# Patient Record
Sex: FEMALE | Race: WHITE | Employment: PART TIME | URBAN - NONMETROPOLITAN AREA
[De-identification: names, ages, dates, MRNs, and addresses within clinical notes are randomized per-mention and may not be internally consistent; named-entity substitution may affect disease eponyms.]

---

## 2017-06-14 ENCOUNTER — TRANSFERRED RECORDS (OUTPATIENT)
Dept: HEALTH INFORMATION MANAGEMENT | Facility: HOSPITAL | Age: 37
End: 2017-06-14

## 2017-06-15 ENCOUNTER — TRANSFERRED RECORDS (OUTPATIENT)
Dept: HEALTH INFORMATION MANAGEMENT | Facility: HOSPITAL | Age: 37
End: 2017-06-15

## 2017-06-21 RX ORDER — NORGESTIMATE AND ETHINYL ESTRADIOL 7DAYSX3 28
1 KIT ORAL DAILY
Status: ON HOLD | COMMUNITY
End: 2017-07-28

## 2017-06-27 ENCOUNTER — OFFICE VISIT (OUTPATIENT)
Dept: OBGYN | Facility: OTHER | Age: 37
End: 2017-06-27
Attending: OBSTETRICS & GYNECOLOGY
Payer: MEDICAID

## 2017-06-27 VITALS
HEART RATE: 80 BPM | SYSTOLIC BLOOD PRESSURE: 112 MMHG | HEIGHT: 65 IN | BODY MASS INDEX: 38.99 KG/M2 | WEIGHT: 234 LBS | DIASTOLIC BLOOD PRESSURE: 62 MMHG | TEMPERATURE: 98.3 F | RESPIRATION RATE: 16 BRPM

## 2017-06-27 DIAGNOSIS — Z30.2 ENCOUNTER FOR STERILIZATION: Primary | ICD-10-CM

## 2017-06-27 DIAGNOSIS — Z71.6 TOBACCO ABUSE COUNSELING: ICD-10-CM

## 2017-06-27 PROCEDURE — 99212 OFFICE O/P EST SF 10 MIN: CPT

## 2017-06-27 PROCEDURE — 99203 OFFICE O/P NEW LOW 30 MIN: CPT | Mod: 57 | Performed by: OBSTETRICS & GYNECOLOGY

## 2017-06-27 ASSESSMENT — PAIN SCALES - GENERAL: PAINLEVEL: NO PAIN (0)

## 2017-06-27 NOTE — NURSING NOTE
"Chief Complaint   Patient presents with     Consult     Consult for a tubal ligation per Dr Jones.       Initial /62  Pulse 80  Temp 98.3  F (36.8  C) (Tympanic)  Resp 16  Ht 5' 4.75\" (1.645 m)  Wt 234 lb (106.1 kg)  BMI 39.24 kg/m2 Estimated body mass index is 39.24 kg/(m^2) as calculated from the following:    Height as of this encounter: 5' 4.75\" (1.645 m).    Weight as of this encounter: 234 lb (106.1 kg).  Medication Reconciliation: complete   Meagan Thomas      "

## 2017-06-27 NOTE — MR AVS SNAPSHOT
"              After Visit Summary   6/27/2017    Devorah Parker    MRN: [id number]           Patient Information     Date Of Birth          1980        Visit Information        Provider Department      6/27/2017 2:00 PM Perez Valdivia MD [facility] [place]        Care Instructions    Nothing to eat or drink after midnight prior to procedure.            Follow-ups after your visit        Your next 10 appointments already scheduled     Jul 21, 2017  1:30 PM CDT   (Arrive by 1:15 PM)   Pre-Op physical with Ely Potter NP   [facility] [place] ([facility] [place] )    [address]  [place] [address]   [phone]              Who to contact     If you have questions or need follow up information about today's clinic visit or your schedule please contact [facility] directly at [phone].  Normal or non-critical lab and imaging results will be communicated to you by MyChart, letter or phone within 4 business days after the clinic has received the results. If you do not hear from us within 7 days, please contact the clinic through MyChart or phone. If you have a critical or abnormal lab result, we will notify you by phone as soon as possible.  Submit refill requests through [employer] or call your pharmacy and they will forward the refill request to us. Please allow 3 business days for your refill to be completed.          Additional Information About Your Visit        MyChart Information     [employer] lets you send messages to your doctor, view your test results, renew your prescriptions, schedule appointments and more. To sign up, go to [email or web address] . Click on \"Log in\" on the left side of the screen, which will take you to the Welcome page. Then click on \"Sign up Now\" on the right side of the page.     You will be asked to enter the access code listed below, as well as some personal information. Please follow the directions to create your username and password.   " "  Your access code is: [email or web address]  Expires: 2017  6:34 PM     Your access code will  in 90 days. If you need help or a new code, please call your [facility] clinic or [phone].        Care EveryWhere ID     This is your Care EveryWhere ID. This could be used by other organizations to access your [facility] medical records  [id number]        Your Vitals Were     Pulse Temperature Respirations Height BMI (Body Mass Index)       80 98.3  F (36.8  C) (Tympanic) 16 5' 4.75\" (1.645 m) 39.24 kg/m2        Blood Pressure from Last 3 Encounters:   17 112/62   16 109/64   01/01/15 113/71    Weight from Last 3 Encounters:   17 234 lb (106.1 kg)   16 260 lb (117.9 kg)   14 260 lb (117.9 kg)              Today, you had the following     No orders found for display         Today's Medication Changes          These changes are accurate as of: 17 11:59 PM.  If you have any questions, ask your nurse or doctor.               Stop taking these medicines if you haven't already. Please contact your care team if you have questions.     prenatal multivitamin  plus iron 27-0.8 MG Tabs per tablet   Stopped by:  Perez Valdivia MD                    Primary Care Provider Office Phone # Fax #    Meagan Keane [phone]       [address] [address]        Equal Access to Services     [facility] AH: Hadii cody ku hadasho Soomaali, waaxda luqadaha, qaybta kaalmada adeegyada, kb crawford . So [facility] [phone].    ATENCIÓN: Si habla español, tiene a mills disposición servicios gratuitos de asistencia lingüística. Llame al [phone].    We comply with applicable federal civil rights laws and [place] laws. We do not discriminate on the basis of race, color, national origin, age, disability sex, sexual orientation or gender identity.            Thank you!     Thank you for choosing [facility]  for your " care. Our goal is always to provide you with excellent care. Hearing back from our patients is one way we can continue to improve our services. Please take a few minutes to complete the written survey that you may receive in the mail after your visit with us. Thank you!             Your Updated Medication List - Protect others around you: Learn how to safely use, store and throw away your medicines at www.disposemymeds.org.          This list is accurate as of: 6/27/17 11:59 PM.  Always use your most recent med list.                   Brand Name Dispense Instructions for use Diagnosis    NICORETTE MT      Take 2 mg by mouth every hour as needed for smoking cessation        norgestim-eth estrad triphasic 0.18/0.215/0.25 MG-35 MCG per tablet    ORTHO TRI-CYCLEN, TRI-SPRINTEC     Take 1 tablet by mouth daily

## 2017-06-28 NOTE — PROGRESS NOTES
"CC:  Consult from dr. Keane for Sterilization  HPI:  Devorah Parker is a 36 year old female is a   P4 (vag).  No LMP recorded.  Menses are regular q 28-30 days, lasting 5 days. Currently on OCP's and a smoker.  Pt desiring permanents sterilization.  100% sure.      Outside records reviewed.      Patients records are available and reviewed at today's visit.    Past GYN history:  No STD history.  CIS (LEEP )       Last PAP smear:  Normal/Pending      Past Medical History:   Diagnosis Date     ADD (attention deficit disorder)      Anxiety      Cervical dysplasia      Depression      GERD (gastroesophageal reflux disease)      Obesity        Past Surgical History:   Procedure Laterality Date     ARTHROSCOPY KNEE Left     ACL reconstruction     LEEP TX, CERVICAL       TONSILLECTOMY      at 4 yrs       Family History   Problem Relation Age of Onset     KIDNEY DISEASE Mother      stones     Breast Cancer Mother      Myocardial Infarction Maternal Grandmother       at age 50s     Lung Cancer Paternal Grandfather        Allergies: Review of patient's allergies indicates no known allergies.    Current Outpatient Prescriptions   Medication Sig Dispense Refill     Nicotine Polacrilex (NICORETTE MT) Take 2 mg by mouth every hour as needed for smoking cessation       norgestim-eth estrad triphasic (ORTHO TRI-CYCLEN, TRI-SPRINTEC) 0.18/0.215/0.25 MG-35 MCG per tablet Take 1 tablet by mouth daily         ROS:  C: NEGATIVE for fever, chills, change in weight  GI: NEGATIVE for nausea, abdominal pain, heartburn, or change in bowel habits  : NEGATIVE for frequency, dysuria, hematuria, vaginal discharge      EXAM:  Blood pressure 112/62, pulse 80, temperature 98.3  F (36.8  C), temperature source Tympanic, resp. rate 16, height 5' 4.75\" (1.645 m), weight 234 lb (106.1 kg), unknown if currently breastfeeding.   BMI= Body mass index is 39.24 kg/(m^2).  General - pleasant female in no acute distress.  Abdomen - soft, " nontender, nondistended, no hepatosplenomegaly.  Musculoskeletal - no gross deformities.  Neurological - normal mental status.  Extremities:  No edema      ASSESSMENT/PLAN:  Desires Sterilization  Contraceptive alternatives discussed.  Reviewed goals, risks, alternatives for tubal occlusion procedure including risk of anesthesia, bleeding, infection, damage to nerves, blood vessels, bowel and bladder. Discussed irreversibility of procedure, 1% failure rate, tubal pregnancy, menstrual changes and regret.  Discussed recovery period and expected discomfort.. All questions were answered. Preoperative instructions discussed.  NPO after midnight.  Federal sterilization request reviewed and signed.  Counseled on and encouraged smoking cessation.  30  minutes were spent with the patient with greater than 50% of the visit spent in face-to-face counseling and coordination of care and review of outside records.  Handouts on procedure reviewed with pt.  .          Perez Valdivia MD

## 2017-06-30 DIAGNOSIS — Z30.2 ENCOUNTER FOR STERILIZATION: Primary | ICD-10-CM

## 2017-07-21 ENCOUNTER — OFFICE VISIT (OUTPATIENT)
Dept: OBGYN | Facility: OTHER | Age: 37
End: 2017-07-21
Attending: NURSE PRACTITIONER
Payer: COMMERCIAL

## 2017-07-21 VITALS
BODY MASS INDEX: 37.99 KG/M2 | HEART RATE: 72 BPM | DIASTOLIC BLOOD PRESSURE: 64 MMHG | WEIGHT: 228 LBS | TEMPERATURE: 97.8 F | HEIGHT: 65 IN | SYSTOLIC BLOOD PRESSURE: 110 MMHG

## 2017-07-21 DIAGNOSIS — Z01.818 PREOP GENERAL PHYSICAL EXAM: Primary | ICD-10-CM

## 2017-07-21 DIAGNOSIS — Z72.0 TOBACCO ABUSE: ICD-10-CM

## 2017-07-21 LAB
ERYTHROCYTE [DISTWIDTH] IN BLOOD BY AUTOMATED COUNT: 13.3 % (ref 10–15)
HCT VFR BLD AUTO: 37.5 % (ref 35–47)
HGB BLD-MCNC: 12.7 G/DL (ref 11.7–15.7)
MCH RBC QN AUTO: 28 PG (ref 26.5–33)
MCHC RBC AUTO-ENTMCNC: 33.9 G/DL (ref 31.5–36.5)
MCV RBC AUTO: 83 FL (ref 78–100)
PLATELET # BLD AUTO: 188 10E9/L (ref 150–450)
RBC # BLD AUTO: 4.54 10E12/L (ref 3.8–5.2)
WBC # BLD AUTO: 4.5 10E9/L (ref 4–11)

## 2017-07-21 PROCEDURE — 99212 OFFICE O/P EST SF 10 MIN: CPT

## 2017-07-21 PROCEDURE — 99214 OFFICE O/P EST MOD 30 MIN: CPT | Performed by: NURSE PRACTITIONER

## 2017-07-21 PROCEDURE — 85027 COMPLETE CBC AUTOMATED: CPT | Mod: ZL | Performed by: NURSE PRACTITIONER

## 2017-07-21 PROCEDURE — 36415 COLL VENOUS BLD VENIPUNCTURE: CPT | Mod: ZL | Performed by: NURSE PRACTITIONER

## 2017-07-21 NOTE — PROGRESS NOTES
[facility] [place]  [address]  [place] [address]  [phone]  Dept: [phone]    PRE-OP EVALUATION:  Today's date: 2017    Devorah Parker (: 1980) presents for pre-operative evaluation assessment as requested by Dr. Valdivia.  She requires evaluation and anesthesia risk assessment prior to undergoing surgery/procedure for sterilization .  Proposed procedure: Bilateral Tubal Occlusion    Date of Surgery/ Procedure: 17  Time of Surgery/ Procedure: To be determined  Hospital/Surgical Facility: [facility]    Primary Physician: Meagan Keane  Type of Anesthesia Anticipated: to be determined    Patient has a Health Care Directive or Living Will:  NO    1. NO - Do you have a history of heart attack, stroke, stent, bypass or surgery on an artery in the head, neck, heart or legs?  2. NO - Do you ever have any pain or discomfort in your chest?  3. NO - Do you have a history of  Heart Failure?  4. NO - Are you troubled by shortness of breath when: walking on the level, up a slight hill or at night?  5. NO - Do you currently have a cold, bronchitis or other respiratory infection?  6. NO - Do you have a cough, shortness of breath or wheezing?  7. NO - Do you sometimes get pains in the calves of your legs when you walk?  8. NO - Do you or anyone in your family have previous history of blood clots?  9. NO - Do you or does anyone in your family have a serious bleeding problem such as prolonged bleeding following surgeries or cuts?  10. NO - Have you ever had problems with anemia or been told to take iron pills?  11. NO - Have you had any abnormal blood loss such as black, tarry or bloody stools, or abnormal vaginal bleeding?  12. NO - Have you ever had a blood transfusion?  13. NO - Have you or any of your relatives ever had problems with anesthesia?  14. NO - Do you have sleep apnea, excessive snoring or daytime drowsiness?  15. NO - Do you have any prosthetic heart  valves?  16. NO - Do you have prosthetic joints?  17. NO - Is there any chance that you may be pregnant?        HPI:                                                      Brief HPI related to upcoming procedure: desires sterilization as she has completed child bearing.       See problem list for active medical problems.  Problems all longstanding and stable, except as noted/documented.  See ROS for pertinent symptoms related to these conditions.                                                                                                  .    MEDICAL HISTORY:                                                    Patient Active Problem List    Diagnosis Date Noted     Normal labor and delivery 03/18/2016     Priority: Medium     Normal pregnancy 03/17/2016     Priority: Medium     Active labor 12/31/2014     Priority: Medium     Vaginal delivery 12/31/2014     Priority: Medium     Encounter for triage in pregnant patient 12/30/2014     Priority: Medium     Attention deficit disorder 09/13/2012     Priority: Medium     Depression 06/15/2011     Priority: Medium     Adiposity 05/16/2008     Priority: Medium     History of cervical dysplasia 05/16/2008     Priority: Medium     Overview:   IMO Update 10/11       Cervical intraepithelial neoplasia 11/26/2007     Priority: Medium     Overview:   IMO Update 10/11       Positive test for human papillomavirus (HPV) 11/06/2007     Priority: Medium     Nonspecific abnormal finding 02/05/2007     Priority: Medium     Overview:   HPV +, colp low grade, path chronic cervicitis        Past Medical History:   Diagnosis Date     ADD (attention deficit disorder)      Anxiety      Cervical dysplasia      Depression      GERD (gastroesophageal reflux disease)      Obesity      Past Surgical History:   Procedure Laterality Date     ARTHROSCOPY KNEE Left     ACL reconstruction     LEEP TX, CERVICAL       TONSILLECTOMY      at 4 yrs     Current Outpatient Prescriptions   Medication Sig  "Dispense Refill     Nicotine Polacrilex (NICORETTE MT) Take 2 mg by mouth every hour as needed for smoking cessation       norgestim-eth estrad triphasic (ORTHO TRI-CYCLEN, TRI-SPRINTEC) 0.18/0.215/0.25 MG-35 MCG per tablet Take 1 tablet by mouth daily       OTC products: None, except as noted above    No Known Allergies   Latex Allergy: NO    Social History   Substance Use Topics     Smoking status: Current Every Day Smoker     Packs/day: 0.25     Types: Cigarettes     Smokeless tobacco: Never Used      Comment: using gum     Alcohol use No     History   Drug Use No       REVIEW OF SYSTEMS:                                                    Constitutional, HEENT, cardiovascular, pulmonary, gi and gu systems are negative, except as otherwise noted.      EXAM:                                                    /64 (BP Location: Right arm, Patient Position: Sitting, Cuff Size: Adult Large)  Pulse 72  Temp 97.8  F (36.6  C) (Tympanic)  Ht 5' 4.75\" (1.645 m)  Wt 228 lb (103.4 kg)  Breastfeeding? No  BMI 38.23 kg/m2    GENERAL APPEARANCE: healthy, alert and no distress     EYES: EOMI, PERRL     HENT: ear canals and TM's normal and nose and mouth without ulcers or lesions     NECK: no adenopathy, no asymmetry, masses, or scars and thyroid normal to palpation     RESP: lungs clear to auscultation - no rales, rhonchi or wheezes     CV: regular rates and rhythm, normal S1 S2, no S3 or S4 and no murmur, click or rub     ABDOMEN:  soft, nontender, no HSM or masses and bowel sounds normal     MS: extremities normal- no gross deformities noted, no evidence of inflammation in joints, FROM in all extremities.     SKIN: no suspicious lesions or rashes     NEURO: Normal strength and tone, sensory exam grossly normal, mentation intact and speech normal     PSYCH: mentation appears normal. and affect normal/bright     LYMPHATICS: No axillary, cervical, or supraclavicular nodes    DIAGNOSTICS:                                "                       Labs Resulted Today:   Results for orders placed or performed in visit on 07/21/17   CBC with platelets   Result Value Ref Range    WBC 4.5 4.0 - 11.0 10e9/L    RBC Count 4.54 3.8 - 5.2 10e12/L    Hemoglobin 12.7 11.7 - 15.7 g/dL    Hematocrit 37.5 35.0 - 47.0 %    MCV 83 78 - 100 fl    MCH 28.0 26.5 - 33.0 pg    MCHC 33.9 31.5 - 36.5 g/dL    RDW 13.3 10.0 - 15.0 %    Platelet Count 188 150 - 450 10e9/L       Recent Labs   Lab Test  03/19/16   0920  03/18/16   0826   12/31/14   0050   HGB  10.7*  12.5   < >  12.2   PLT   --   191   --   191    < > = values in this interval not displayed.        IMPRESSION:                                                    Reason for surgery/procedure: female sterilization  Diagnosis/reason for consult: pre op clearance    The proposed surgical procedure is considered LOW risk.    REVISED CARDIAC RISK INDEX  The patient has the following serious cardiovascular risks for perioperative complications such as (MI, PE, VFib and 3  AV Block):  No serious cardiac risks  INTERPRETATION: 1 risks: Class II (low risk - 0.9% complication rate)    No diagnosis found.    RECOMMENDATIONS:                                                        Pulmonary Risk  Advised smoking cessation.         --Patient is to take all scheduled medications on the day of surgery EXCEPT for modifications listed below.    APPROVAL GIVEN to proceed with proposed procedure, without further diagnostic evaluation       Signed Electronically by: Ely Potter NP    Copy of this evaluation report is provided to requesting physician.    [facility] Preop Guidelines

## 2017-07-21 NOTE — NURSING NOTE
"Chief Complaint   Patient presents with     Pre-Op Exam     Patient is scheduled on 7/28/17 with Dr. Valdivia for a bilateral tubal occlusion at [facility].       Initial /64 (BP Location: Right arm, Patient Position: Sitting, Cuff Size: Adult Large)  Pulse 72  Temp 97.8  F (36.6  C) (Tympanic)  Ht 5' 4.75\" (1.645 m)  Wt 228 lb (103.4 kg)  Breastfeeding? No  BMI 38.23 kg/m2 Estimated body mass index is 38.23 kg/(m^2) as calculated from the following:    Height as of this encounter: 5' 4.75\" (1.645 m).    Weight as of this encounter: 228 lb (103.4 kg).  Medication Reconciliation: complete   Rosa Isela Perez      "

## 2017-07-21 NOTE — MR AVS SNAPSHOT
After Visit Summary   7/21/2017    Devorah Parker    MRN: [id number]           Patient Information     Date Of Birth          1980        Visit Information        Provider Department      7/21/2017 1:30 PM Ely Potter NP [facility] [place]        Today's Diagnoses     Preop general physical exam    -  1    Tobacco abuse          Care Instructions      Before Your Surgery      Call your surgeon if there is any change in your health. This includes signs of a cold or flu (such as a sore throat, runny nose, cough, rash or fever).    Do not smoke, drink alcohol or take over the counter medicine (unless your surgeon or primary care doctor tells you to) for the 24 hours before and after surgery.    If you take prescribed drugs: Follow your doctor s orders about which medicines to take and which to stop until after surgery.    Eating and drinking prior to surgery: follow the instructions from your surgeon    Take a shower or bath the night before surgery. Use the soap your surgeon gave you to gently clean your skin. If you do not have soap from your surgeon, use your regular soap. Do not shave or scrub the surgery site.  Wear clean pajamas and have clean sheets on your bed.   HOW TO QUIT SMOKING  Smoking is one of the hardest habits to break. About half of all those who have ever smoked have been able to quit, and most of those (about 70%) who still smoke want to quit. Here are some of the best ways to stop smoking.     KEEP TRYING:  It takes most smokers about 8 tries before they are finally able to fully quit. So, the more often you try and fail, the better your chance of quitting the next time! So, don't give up!    GO COLD TURKEY:  Most ex-smokers quit cold turkey. Trying to cut back gradually doesn't seem to work as well, perhaps because it continues the smoking habit. Also, it is possible to fool yourself by inhaling more while smoking fewer cigarettes. This results in the same amount of  nicotine in your body!    GET SUPPORT:  Support programs can make an important difference, especially for the heavy smoker. These groups offer lectures, methods to change your behavior and peer support. Call the free national Quitline for more information. 800-QUIT-NOW (603-753-5257). Low-cost or free programs are offered by many hospitals, local chapters of the American Lung Association (833-710-7474) and the American Cancer Society (395-614-6994). Support at home is important too. Non-smokers can help by offering praise and encouragement. If the smoker fails to quit, encourage them to try again!    OVER-THE-COUNTER MEDICINES:  For those who can't quit on their own, Nicotine Replacement Therapy (NRT) may make quitting much easier. Certain aids such as the nicotine patch, gum and lozenge are available without a prescription. However, it is best to use these under the guidance of your doctor. The skin patch provides a steady supply of nicotine to the body. Nicotine gum and lozenge gives temporary bursts of low levels of nicotine. Both methods take the edge off the craving for cigarettes. WARNING: If you feel symptoms of nicotine overdose, such as nausea, vomiting, dizziness, weakness, or fast heartbeat, stop using these and see your doctor.    PRESCRIPTION MEDICINES:  After evaluating your smoking patterns and prior attempts at quitting, your doctor may offer a prescription medicine such as bupropion (Zyban, Wellbutrin), varenicline (Chantix, Champix), a niocotine inhaler or nasal spray. Each has its unique advantage and side effects which your doctor can review with you.    HEALTH BENEFITS OF QUITTING:  The benefits of quitting start right away and keep improving the longer you go without smokin minutes: blood pressure and pulse return to normal  8 hours: oxygen levels return to normal  2 days: ability to smell and taste begins to improve as damaged nerves start to regrow  2-3 weeks: circulation and lung  function improves  1-9 months: decreased cough, congestion and shortness of breath; less tired  1 year: risk of heart attack decreases by half  5 years: risk of lung cancer decreases by half; risk of stroke becomes the same as a non-smoker  For information about how to quit smoking, visit the following links:  National Cancer [place] ,   Clearing the Air, Quit Smoking Today   - an online booklet. http://www.smokefree.gov/pubs/clearing_the_air.pdf  Smokefree.gov http://smokefree.gov/  QuitNet http://www.quitnet.com/    9785-7887 [employer], [address] [place] [address], [place] [address]. All rights reserved. This information is not intended as a substitute for professional medical care. Always follow your healthcare professional's instructions.      The Benefits of Living Smoke Free  What do you want to gain from quitting? Check off some reasons to quit.  Health Benefits  ___ Reduce my risk of lung cancer, heart disease, chronic lung disease  ___ Have fewer wrinkles and softer skin  ___ Improve my sense of taste and smell  ___ For pregnant women--reduce the risk of having a miscarriage, stillbirth, premature birth, or low-birth-weight baby  Personal Benefits  ___ Feel more in control of my life  ___ Have better-smelling hair, breath, clothes, home, and car  ___ Save time by not having to take smoke breaks, buy cigarettes, or hunt for a light  ___ Have whiter teeth  Family Benefits  ___ Reduce my children s respiratory tract infections  ___ Set a good example for my children  ___ Reduce my family s cancer risk  Financial Benefits  ___ Save hundreds of dollars each year that would be spent on cigarettes  ___ Save money on medical bills  ___ Save on life, health, and car insurance premiums    Those Dollars Add Up!  Cigarettes are expensive, and getting more expensive all the time. Do you realize how much money you are spending on cigarettes per year? What is the average amount you spend on a pack of cigarettes? What  is the average number of packs that you smoke per day? Using your answers to these questions, fill in this formula to help you find out:  ($ _____ per pack) ×  ( _____ number of packs per day) × (365 days) =  $ _____ yearly cost of smoking  Besides tobacco, there are other costs, including extra cleaning bills and replacement costs for clothing and furniture; medical expenses for smoking-related illnesses; and higher health, life, and car insurance premiums.    Cigars and Pipes Count Too!  Cigars and pipes are also dangerous. So are smokeless (chewing) tobacco and snuff. All of these products contain nicotine, a highly addictive substance that has harmful effects on your body. Quitting smoking means giving up all tobacco products.      2991-8406 [facility], [address] [place] [address]. All rights reserved. This information is not intended as a substitute for professional medical care. Always follow your healthcare professional's instructions.          Follow-ups after your visit        Your next 10 appointments already scheduled     Jul 28, 2017   Procedure with Perez Valdivia MD   [facility] ([address] [address] [phone]              Who to contact     If you have questions or need follow up information about today's clinic visit or your schedule please contact [facility] directly at [phone].  Normal or non-critical lab and imaging results will be communicated to you by MyChart, letter or phone within 4 business days after the clinic has received the results. If you do not hear from us within 7 days, please contact the clinic through MyChart or phone. If you have a critical or abnormal lab result, we will notify you by phone as soon as possible.  Submit refill requests through [employer] or call your pharmacy and they will forward the refill request to us. Please allow 3 business days for your refill to be completed.  "         Additional Information About Your Visit        MyChart Information     [employer] lets you send messages to your doctor, view your test results, renew your prescriptions, schedule appointments and more. To sign up, go to [email or web address] . Click on \"Log in\" on the left side of the screen, which will take you to the Welcome page. Then click on \"Sign up Now\" on the right side of the page.     You will be asked to enter the access code listed below, as well as some personal information. Please follow the directions to create your username and password.     Your access code is: [email or web address]  Expires: 2017  6:34 PM     Your access code will  in 90 days. If you need help or a new code, please call your [facility] clinic or [phone].        Care EveryWhere ID     This is your Care EveryWhere ID. This could be used by other organizations to access your [facility] medical records  [id number]        Your Vitals Were     Pulse Temperature Height Breastfeeding? BMI (Body Mass Index)       72 97.8  F (36.6  C) (Tympanic) 5' 4.75\" (1.645 m) No 38.23 kg/m2        Blood Pressure from Last 3 Encounters:   17 110/64   17 112/62   16 109/64    Weight from Last 3 Encounters:   17 228 lb (103.4 kg)   17 234 lb (106.1 kg)   16 260 lb (117.9 kg)              We Performed the Following     CBC with platelets     Tobacco Cessation - for Health Maintenance        Primary Care Provider Office Phone # Fax #    Meagan Keane [phone]       [address] [address]        Equal Access to Services     [facility] JEREL : Adonay López, heidi zee, kb [place]. [facility] [phone].    ATENCIÓN: Si habla español, tiene a mills disposición servicios gratuitos de asistencia lingüística. Llantoinette al [phone].    We comply with applicable federal civil rights laws and " [place] laws. We do not discriminate on the basis of race, color, national origin, age, disability sex, sexual orientation or gender identity.            Thank you!     Thank you for choosing [facility]  for your [facility]. Our goal is always to provide you with excellent care. Hearing back from our patients is one way we can continue to improve our services. Please take a few minutes to complete the written survey that you may receive in the mail after your visit with us. Thank you!             Your Updated Medication List - Protect others around you: Learn how to safely use, store and throw away your medicines at www.disposemymeds.org.          This list is accurate as of: 7/21/17  2:41 PM.  Always use your most recent med list.                   Brand Name Dispense Instructions for use Diagnosis    NICORETTE MT      Take 2 mg by mouth every hour as needed for smoking cessation        norgestim-eth estrad triphasic 0.18/0.215/0.25 MG-35 MCG per tablet    ORTHO TRI-CYCLEN, TRI-SPRINTEC     Take 1 tablet by mouth daily

## 2017-07-21 NOTE — PATIENT INSTRUCTIONS
Before Your Surgery      Call your surgeon if there is any change in your health. This includes signs of a cold or flu (such as a sore throat, runny nose, cough, rash or fever).    Do not smoke, drink alcohol or take over the counter medicine (unless your surgeon or primary care doctor tells you to) for the 24 hours before and after surgery.    If you take prescribed drugs: Follow your doctor s orders about which medicines to take and which to stop until after surgery.    Eating and drinking prior to surgery: follow the instructions from your surgeon    Take a shower or bath the night before surgery. Use the soap your surgeon gave you to gently clean your skin. If you do not have soap from your surgeon, use your regular soap. Do not shave or scrub the surgery site.  Wear clean pajamas and have clean sheets on your bed.   HOW TO QUIT SMOKING  Smoking is one of the hardest habits to break. About half of all those who have ever smoked have been able to quit, and most of those (about 70%) who still smoke want to quit. Here are some of the best ways to stop smoking.     KEEP TRYING:  It takes most smokers about 8 tries before they are finally able to fully quit. So, the more often you try and fail, the better your chance of quitting the next time! So, don't give up!    GO COLD TURKEY:  Most ex-smokers quit cold turkey. Trying to cut back gradually doesn't seem to work as well, perhaps because it continues the smoking habit. Also, it is possible to fool yourself by inhaling more while smoking fewer cigarettes. This results in the same amount of nicotine in your body!    GET SUPPORT:  Support programs can make an important difference, especially for the heavy smoker. These groups offer lectures, methods to change your behavior and peer support. Call the free national Quitline for more information. 800-QUIT-NOW (853-533-9522). Low-cost or free programs are offered by many hospitals, local chapters of the American Lung  Association (149-317-2635) and the American Cancer Society (888-076-4573). Support at home is important too. Non-smokers can help by offering praise and encouragement. If the smoker fails to quit, encourage them to try again!    OVER-THE-COUNTER MEDICINES:  For those who can't quit on their own, Nicotine Replacement Therapy (NRT) may make quitting much easier. Certain aids such as the nicotine patch, gum and lozenge are available without a prescription. However, it is best to use these under the guidance of your doctor. The skin patch provides a steady supply of nicotine to the body. Nicotine gum and lozenge gives temporary bursts of low levels of nicotine. Both methods take the edge off the craving for cigarettes. WARNING: If you feel symptoms of nicotine overdose, such as nausea, vomiting, dizziness, weakness, or fast heartbeat, stop using these and see your doctor.    PRESCRIPTION MEDICINES:  After evaluating your smoking patterns and prior attempts at quitting, your doctor may offer a prescription medicine such as bupropion (Zyban, Wellbutrin), varenicline (Chantix, Champix), a niocotine inhaler or nasal spray. Each has its unique advantage and side effects which your doctor can review with you.    HEALTH BENEFITS OF QUITTING:  The benefits of quitting start right away and keep improving the longer you go without smokin minutes: blood pressure and pulse return to normal  8 hours: oxygen levels return to normal  2 days: ability to smell and taste begins to improve as damaged nerves start to regrow  2-3 weeks: circulation and lung function improves  1-9 months: decreased cough, congestion and shortness of breath; less tired  1 year: risk of heart attack decreases by half  5 years: risk of lung cancer decreases by half; risk of stroke becomes the same as a non-smoker  For information about how to quit smoking, visit the following links:  National Cancer [place] ,   Clearing the Air, Quit Smoking Today   -  an online booklet. http://www.smokefree.gov/pubs/clearing_the_air.pdf  Smokefree.gov http://smokefree.gov/  QuitNet http://www.quitnet.com/    5456-2543 [employer], [address] [place] [address], [place] [address]. All rights reserved. This information is not intended as a substitute for professional medical care. Always follow your healthcare professional's instructions.      The Benefits of Living Smoke Free  What do you want to gain from quitting? Check off some reasons to quit.  Health Benefits  ___ Reduce my risk of lung cancer, heart disease, chronic lung disease  ___ Have fewer wrinkles and softer skin  ___ Improve my sense of taste and smell  ___ For pregnant women--reduce the risk of having a miscarriage, stillbirth, premature birth, or low-birth-weight baby  Personal Benefits  ___ Feel more in control of my life  ___ Have better-smelling hair, breath, clothes, home, and car  ___ Save time by not having to take smoke breaks, buy cigarettes, or hunt for a light  ___ Have whiter teeth  Family Benefits  ___ Reduce my children s respiratory tract infections  ___ Set a good example for my children  ___ Reduce my family s cancer risk  Financial Benefits  ___ Save hundreds of dollars each year that would be spent on cigarettes  ___ Save money on medical bills  ___ Save on life, health, and car insurance premiums    Those Dollars Add Up!  Cigarettes are expensive, and getting more expensive all the time. Do you realize how much money you are spending on cigarettes per year? What is the average amount you spend on a pack of cigarettes? What is the average number of packs that you smoke per day? Using your answers to these questions, fill in this formula to help you find out:  ($ _____ per pack) ×  ( _____ number of packs per day) × (365 days) =  $ _____ yearly cost of smoking  Besides tobacco, there are other costs, including extra cleaning bills and replacement costs for clothing and furniture; medical expenses for  smoking-related illnesses; and higher health, life, and car insurance premiums.    Cigars and Pipes Count Too!  Cigars and pipes are also dangerous. So are smokeless (chewing) tobacco and snuff. All of these products contain nicotine, a highly addictive substance that has harmful effects on your body. Quitting smoking means giving up all tobacco products.      7976-1658 [facility], [address] [place] [address], [place] [address]. All rights reserved. This information is not intended as a substitute for professional medical care. Always follow your healthcare professional's instructions.

## 2017-07-26 ENCOUNTER — ANESTHESIA EVENT (OUTPATIENT)
Dept: SURGERY | Facility: HOSPITAL | Age: 37
End: 2017-07-26
Payer: COMMERCIAL

## 2017-07-26 ASSESSMENT — LIFESTYLE VARIABLES: TOBACCO_USE: 1

## 2017-07-26 NOTE — H&P (VIEW-ONLY)
[facility] [place]  [address]  [place] [address]  [phone]  Dept: [phone]    PRE-OP EVALUATION:  Today's date: 2017    Devorah Parker (: 1980) presents for pre-operative evaluation assessment as requested by Dr. Valdivia.  She requires evaluation and anesthesia risk assessment prior to undergoing surgery/procedure for sterilization .  Proposed procedure: Bilateral Tubal Occlusion    Date of Surgery/ Procedure: 17  Time of Surgery/ Procedure: To be determined  Hospital/Surgical Facility: [facility]    Primary Physician: Meagan Keane  Type of Anesthesia Anticipated: to be determined    Patient has a Health Care Directive or Living Will:  NO    1. NO - Do you have a history of heart attack, stroke, stent, bypass or surgery on an artery in the head, neck, heart or legs?  2. NO - Do you ever have any pain or discomfort in your chest?  3. NO - Do you have a history of  Heart Failure?  4. NO - Are you troubled by shortness of breath when: walking on the level, up a slight hill or at night?  5. NO - Do you currently have a cold, bronchitis or other respiratory infection?  6. NO - Do you have a cough, shortness of breath or wheezing?  7. NO - Do you sometimes get pains in the calves of your legs when you walk?  8. NO - Do you or anyone in your family have previous history of blood clots?  9. NO - Do you or does anyone in your family have a serious bleeding problem such as prolonged bleeding following surgeries or cuts?  10. NO - Have you ever had problems with anemia or been told to take iron pills?  11. NO - Have you had any abnormal blood loss such as black, tarry or bloody stools, or abnormal vaginal bleeding?  12. NO - Have you ever had a blood transfusion?  13. NO - Have you or any of your relatives ever had problems with anesthesia?  14. NO - Do you have sleep apnea, excessive snoring or daytime drowsiness?  15. NO - Do you have any prosthetic heart  valves?  16. NO - Do you have prosthetic joints?  17. NO - Is there any chance that you may be pregnant?        HPI:                                                      Brief HPI related to upcoming procedure: desires sterilization as she has completed child bearing.       See problem list for active medical problems.  Problems all longstanding and stable, except as noted/documented.  See ROS for pertinent symptoms related to these conditions.                                                                                                  .    MEDICAL HISTORY:                                                    Patient Active Problem List    Diagnosis Date Noted     Normal labor and delivery 03/18/2016     Priority: Medium     Normal pregnancy 03/17/2016     Priority: Medium     Active labor 12/31/2014     Priority: Medium     Vaginal delivery 12/31/2014     Priority: Medium     Encounter for triage in pregnant patient 12/30/2014     Priority: Medium     Attention deficit disorder 09/13/2012     Priority: Medium     Depression 06/15/2011     Priority: Medium     Adiposity 05/16/2008     Priority: Medium     History of cervical dysplasia 05/16/2008     Priority: Medium     Overview:   IMO Update 10/11       Cervical intraepithelial neoplasia 11/26/2007     Priority: Medium     Overview:   IMO Update 10/11       Positive test for human papillomavirus (HPV) 11/06/2007     Priority: Medium     Nonspecific abnormal finding 02/05/2007     Priority: Medium     Overview:   HPV +, colp low grade, path chronic cervicitis        Past Medical History:   Diagnosis Date     ADD (attention deficit disorder)      Anxiety      Cervical dysplasia      Depression      GERD (gastroesophageal reflux disease)      Obesity      Past Surgical History:   Procedure Laterality Date     ARTHROSCOPY KNEE Left     ACL reconstruction     LEEP TX, CERVICAL       TONSILLECTOMY      at 4 yrs     Current Outpatient Prescriptions   Medication Sig  "Dispense Refill     Nicotine Polacrilex (NICORETTE MT) Take 2 mg by mouth every hour as needed for smoking cessation       norgestim-eth estrad triphasic (ORTHO TRI-CYCLEN, TRI-SPRINTEC) 0.18/0.215/0.25 MG-35 MCG per tablet Take 1 tablet by mouth daily       OTC products: None, except as noted above    No Known Allergies   Latex Allergy: NO    Social History   Substance Use Topics     Smoking status: Current Every Day Smoker     Packs/day: 0.25     Types: Cigarettes     Smokeless tobacco: Never Used      Comment: using gum     Alcohol use No     History   Drug Use No       REVIEW OF SYSTEMS:                                                    Constitutional, HEENT, cardiovascular, pulmonary, gi and gu systems are negative, except as otherwise noted.      EXAM:                                                    /64 (BP Location: Right arm, Patient Position: Sitting, Cuff Size: Adult Large)  Pulse 72  Temp 97.8  F (36.6  C) (Tympanic)  Ht 5' 4.75\" (1.645 m)  Wt 228 lb (103.4 kg)  Breastfeeding? No  BMI 38.23 kg/m2    GENERAL APPEARANCE: healthy, alert and no distress     EYES: EOMI, PERRL     HENT: ear canals and TM's normal and nose and mouth without ulcers or lesions     NECK: no adenopathy, no asymmetry, masses, or scars and thyroid normal to palpation     RESP: lungs clear to auscultation - no rales, rhonchi or wheezes     CV: regular rates and rhythm, normal S1 S2, no S3 or S4 and no murmur, click or rub     ABDOMEN:  soft, nontender, no HSM or masses and bowel sounds normal     MS: extremities normal- no gross deformities noted, no evidence of inflammation in joints, FROM in all extremities.     SKIN: no suspicious lesions or rashes     NEURO: Normal strength and tone, sensory exam grossly normal, mentation intact and speech normal     PSYCH: mentation appears normal. and affect normal/bright     LYMPHATICS: No axillary, cervical, or supraclavicular nodes    DIAGNOSTICS:                                "                       Labs Resulted Today:   Results for orders placed or performed in visit on 07/21/17   CBC with platelets   Result Value Ref Range    WBC 4.5 4.0 - 11.0 10e9/L    RBC Count 4.54 3.8 - 5.2 10e12/L    Hemoglobin 12.7 11.7 - 15.7 g/dL    Hematocrit 37.5 35.0 - 47.0 %    MCV 83 78 - 100 fl    MCH 28.0 26.5 - 33.0 pg    MCHC 33.9 31.5 - 36.5 g/dL    RDW 13.3 10.0 - 15.0 %    Platelet Count 188 150 - 450 10e9/L       Recent Labs   Lab Test  03/19/16   0920  03/18/16   0826   12/31/14   0050   HGB  10.7*  12.5   < >  12.2   PLT   --   191   --   191    < > = values in this interval not displayed.        IMPRESSION:                                                    Reason for surgery/procedure: female sterilization  Diagnosis/reason for consult: pre op clearance    The proposed surgical procedure is considered LOW risk.    REVISED CARDIAC RISK INDEX  The patient has the following serious cardiovascular risks for perioperative complications such as (MI, PE, VFib and 3  AV Block):  No serious cardiac risks  INTERPRETATION: 1 risks: Class II (low risk - 0.9% complication rate)    No diagnosis found.    RECOMMENDATIONS:                                                        Pulmonary Risk  Advised smoking cessation.         --Patient is to take all scheduled medications on the day of surgery EXCEPT for modifications listed below.    APPROVAL GIVEN to proceed with proposed procedure, without further diagnostic evaluation       Signed Electronically by: Ely Potter NP    Copy of this evaluation report is provided to requesting physician.    [facility] Preop Guidelines

## 2017-07-26 NOTE — ANESTHESIA PREPROCEDURE EVALUATION
Anesthesia Evaluation     . Pt has had prior anesthetic. Type: General    No history of anesthetic complications          ROS/MED HX    ENT/Pulmonary:     (+)tobacco use, Current use 1/2ppd packs/day  , . .    Neurologic: Comment: H/O ADD - neg neurologic ROS     Cardiovascular:  - neg cardiovascular ROS       METS/Exercise Tolerance:     Hematologic:  - neg hematologic  ROS       Musculoskeletal:  - neg musculoskeletal ROS       GI/Hepatic:     (+) GERD Asymptomatic on medication,       Renal/Genitourinary:  - ROS Renal section negative       Endo:     (+) Obesity, .      Psychiatric:     (+) psychiatric history anxiety and depression      Infectious Disease:  - neg infectious disease ROS       Malignancy:      - no malignancy   Other:    - neg other ROS                 Physical Exam  Normal systems: dental    Airway   Mallampati: II  TM distance: >3 FB  Neck ROM: full    Dental     Cardiovascular   Rhythm and rate: regular and normal      Pulmonary    breath sounds clear to auscultation                    Anesthesia Plan      History & Physical Review  History and physical reviewed and following examination; no interval change.    ASA Status:  3 .    NPO Status:  > 8 hours    Plan for General and ETT with Propofol induction.   PONV prophylaxis:  Ondansetron (or other 5HT-3)       Postoperative Care      Consents  Anesthetic plan, risks, benefits and alternatives discussed with:  Patient..                          .

## 2017-07-28 ENCOUNTER — HOSPITAL ENCOUNTER (OUTPATIENT)
Facility: HOSPITAL | Age: 37
Discharge: HOME | End: 2017-07-28
Attending: OBSTETRICS & GYNECOLOGY | Admitting: OBSTETRICS & GYNECOLOGY
Payer: COMMERCIAL

## 2017-07-28 ENCOUNTER — SURGERY (OUTPATIENT)
Age: 37
End: 2017-07-28

## 2017-07-28 ENCOUNTER — ANESTHESIA (OUTPATIENT)
Dept: SURGERY | Facility: HOSPITAL | Age: 37
End: 2017-07-28
Payer: COMMERCIAL

## 2017-07-28 VITALS
TEMPERATURE: 98.1 F | DIASTOLIC BLOOD PRESSURE: 55 MMHG | HEIGHT: 64 IN | BODY MASS INDEX: 39.4 KG/M2 | RESPIRATION RATE: 16 BRPM | WEIGHT: 230.8 LBS | SYSTOLIC BLOOD PRESSURE: 100 MMHG | OXYGEN SATURATION: 99 %

## 2017-07-28 DIAGNOSIS — Z98.890 POST-OPERATIVE STATE: Primary | ICD-10-CM

## 2017-07-28 LAB — HCG UR QL: NEGATIVE

## 2017-07-28 PROCEDURE — 25000128 H RX IP 250 OP 636: Performed by: ANESTHESIOLOGY

## 2017-07-28 PROCEDURE — 71000027 ZZH RECOVERY PHASE 2 EACH 15 MINS: Performed by: OBSTETRICS & GYNECOLOGY

## 2017-07-28 PROCEDURE — 81025 URINE PREGNANCY TEST: CPT | Performed by: OBSTETRICS & GYNECOLOGY

## 2017-07-28 PROCEDURE — 25000128 H RX IP 250 OP 636: Performed by: NURSE ANESTHETIST, CERTIFIED REGISTERED

## 2017-07-28 PROCEDURE — 36000058 ZZH SURGERY LEVEL 3 EA 15 ADDTL MIN: Performed by: OBSTETRICS & GYNECOLOGY

## 2017-07-28 PROCEDURE — 25000566 ZZH SEVOFLURANE, EA 15 MIN: Performed by: ANESTHESIOLOGY

## 2017-07-28 PROCEDURE — 37000009 ZZH ANESTHESIA TECHNICAL FEE, EACH ADDTL 15 MIN: Performed by: OBSTETRICS & GYNECOLOGY

## 2017-07-28 PROCEDURE — 36000056 ZZH SURGERY LEVEL 3 1ST 30 MIN: Performed by: OBSTETRICS & GYNECOLOGY

## 2017-07-28 PROCEDURE — 40000305 ZZH STATISTIC PRE PROC ASSESS I: Performed by: OBSTETRICS & GYNECOLOGY

## 2017-07-28 PROCEDURE — 27210794 ZZH OR GENERAL SUPPLY STERILE: Performed by: OBSTETRICS & GYNECOLOGY

## 2017-07-28 PROCEDURE — 01999 UNLISTED ANES PROCEDURE: CPT | Performed by: NURSE ANESTHETIST, CERTIFIED REGISTERED

## 2017-07-28 PROCEDURE — 25000132 ZZH RX MED GY IP 250 OP 250 PS 637: Performed by: OBSTETRICS & GYNECOLOGY

## 2017-07-28 PROCEDURE — 58671 LAPAROSCOPY TUBAL BLOCK: CPT | Performed by: ANESTHESIOLOGY

## 2017-07-28 PROCEDURE — 25000128 H RX IP 250 OP 636: Performed by: OBSTETRICS & GYNECOLOGY

## 2017-07-28 PROCEDURE — 71000014 ZZH RECOVERY PHASE 1 LEVEL 2 FIRST HR: Performed by: OBSTETRICS & GYNECOLOGY

## 2017-07-28 PROCEDURE — C1713 ANCHOR/SCREW BN/BN,TIS/BN: HCPCS | Performed by: OBSTETRICS & GYNECOLOGY

## 2017-07-28 PROCEDURE — 37000008 ZZH ANESTHESIA TECHNICAL FEE, 1ST 30 MIN: Performed by: OBSTETRICS & GYNECOLOGY

## 2017-07-28 PROCEDURE — 25000125 ZZHC RX 250: Performed by: NURSE ANESTHETIST, CERTIFIED REGISTERED

## 2017-07-28 PROCEDURE — 58671 LAPAROSCOPY TUBAL BLOCK: CPT | Performed by: OBSTETRICS & GYNECOLOGY

## 2017-07-28 DEVICE — CLIP-FILSHIE: Type: IMPLANTABLE DEVICE | Site: FALLOPIAN TUBE | Status: FUNCTIONAL

## 2017-07-28 RX ORDER — DEXAMETHASONE SODIUM PHOSPHATE 10 MG/ML
INJECTION, SOLUTION INTRAMUSCULAR; INTRAVENOUS PRN
Status: DISCONTINUED | OUTPATIENT
Start: 2017-07-28 | End: 2017-07-28

## 2017-07-28 RX ORDER — LIDOCAINE 40 MG/G
CREAM TOPICAL
Status: DISCONTINUED | OUTPATIENT
Start: 2017-07-28 | End: 2017-07-28 | Stop reason: HOSPADM

## 2017-07-28 RX ORDER — LIDOCAINE HYDROCHLORIDE 20 MG/ML
INJECTION, SOLUTION INFILTRATION; PERINEURAL PRN
Status: DISCONTINUED | OUTPATIENT
Start: 2017-07-28 | End: 2017-07-28

## 2017-07-28 RX ORDER — ONDANSETRON 4 MG/1
4 TABLET, ORALLY DISINTEGRATING ORAL EVERY 30 MIN PRN
Status: DISCONTINUED | OUTPATIENT
Start: 2017-07-28 | End: 2017-07-28 | Stop reason: HOSPADM

## 2017-07-28 RX ORDER — CEFAZOLIN SODIUM 2 G/100ML
2 INJECTION, SOLUTION INTRAVENOUS
Status: COMPLETED | OUTPATIENT
Start: 2017-07-28 | End: 2017-07-28

## 2017-07-28 RX ORDER — KETOROLAC TROMETHAMINE 30 MG/ML
30 INJECTION, SOLUTION INTRAMUSCULAR; INTRAVENOUS ONCE
Status: COMPLETED | OUTPATIENT
Start: 2017-07-28 | End: 2017-07-28

## 2017-07-28 RX ORDER — FENTANYL CITRATE 50 UG/ML
25-50 INJECTION, SOLUTION INTRAMUSCULAR; INTRAVENOUS EVERY 5 MIN PRN
Status: DISCONTINUED | OUTPATIENT
Start: 2017-07-28 | End: 2017-07-28 | Stop reason: HOSPADM

## 2017-07-28 RX ORDER — FENTANYL CITRATE 50 UG/ML
INJECTION, SOLUTION INTRAMUSCULAR; INTRAVENOUS PRN
Status: DISCONTINUED | OUTPATIENT
Start: 2017-07-28 | End: 2017-07-28

## 2017-07-28 RX ORDER — NALOXONE HYDROCHLORIDE 0.4 MG/ML
.1-.4 INJECTION, SOLUTION INTRAMUSCULAR; INTRAVENOUS; SUBCUTANEOUS
Status: DISCONTINUED | OUTPATIENT
Start: 2017-07-28 | End: 2017-07-28 | Stop reason: HOSPADM

## 2017-07-28 RX ORDER — IBUPROFEN 800 MG/1
800 TABLET, FILM COATED ORAL EVERY 8 HOURS PRN
Qty: 40 TABLET | Refills: 1 | Status: SHIPPED | OUTPATIENT
Start: 2017-07-28 | End: 2021-03-08

## 2017-07-28 RX ORDER — NEOSTIGMINE METHYLSULFATE 1 MG/ML
VIAL (ML) INJECTION PRN
Status: DISCONTINUED | OUTPATIENT
Start: 2017-07-28 | End: 2017-07-28

## 2017-07-28 RX ORDER — SODIUM CHLORIDE, SODIUM LACTATE, POTASSIUM CHLORIDE, CALCIUM CHLORIDE 600; 310; 30; 20 MG/100ML; MG/100ML; MG/100ML; MG/100ML
INJECTION, SOLUTION INTRAVENOUS CONTINUOUS
Status: DISCONTINUED | OUTPATIENT
Start: 2017-07-28 | End: 2017-07-28 | Stop reason: HOSPADM

## 2017-07-28 RX ORDER — ONDANSETRON 4 MG/1
4 TABLET, ORALLY DISINTEGRATING ORAL
Status: DISCONTINUED | OUTPATIENT
Start: 2017-07-28 | End: 2017-07-28 | Stop reason: HOSPADM

## 2017-07-28 RX ORDER — MEPERIDINE HYDROCHLORIDE 25 MG/ML
12.5 INJECTION INTRAMUSCULAR; INTRAVENOUS; SUBCUTANEOUS
Status: DISCONTINUED | OUTPATIENT
Start: 2017-07-28 | End: 2017-07-28 | Stop reason: HOSPADM

## 2017-07-28 RX ORDER — HYDROCODONE BITARTRATE AND ACETAMINOPHEN 5; 325 MG/1; MG/1
2 TABLET ORAL EVERY 6 HOURS PRN
Qty: 30 TABLET | Refills: 0 | Status: SHIPPED | OUTPATIENT
Start: 2017-07-28 | End: 2019-10-21

## 2017-07-28 RX ORDER — PROPOFOL 10 MG/ML
INJECTION, EMULSION INTRAVENOUS PRN
Status: DISCONTINUED | OUTPATIENT
Start: 2017-07-28 | End: 2017-07-28

## 2017-07-28 RX ORDER — HYDROCODONE BITARTRATE AND ACETAMINOPHEN 5; 325 MG/1; MG/1
1-2 TABLET ORAL
Status: COMPLETED | OUTPATIENT
Start: 2017-07-28 | End: 2017-07-28

## 2017-07-28 RX ORDER — ONDANSETRON 2 MG/ML
INJECTION INTRAMUSCULAR; INTRAVENOUS PRN
Status: DISCONTINUED | OUTPATIENT
Start: 2017-07-28 | End: 2017-07-28

## 2017-07-28 RX ORDER — GLYCOPYRROLATE 0.2 MG/ML
INJECTION, SOLUTION INTRAMUSCULAR; INTRAVENOUS PRN
Status: DISCONTINUED | OUTPATIENT
Start: 2017-07-28 | End: 2017-07-28

## 2017-07-28 RX ORDER — ONDANSETRON 2 MG/ML
4 INJECTION INTRAMUSCULAR; INTRAVENOUS EVERY 30 MIN PRN
Status: DISCONTINUED | OUTPATIENT
Start: 2017-07-28 | End: 2017-07-28 | Stop reason: HOSPADM

## 2017-07-28 RX ADMIN — PROPOFOL 200 MG: 10 INJECTION, EMULSION INTRAVENOUS at 13:03

## 2017-07-28 RX ADMIN — DEXAMETHASONE SODIUM PHOSPHATE 10 MG: 10 INJECTION, SOLUTION INTRAMUSCULAR; INTRAVENOUS at 13:13

## 2017-07-28 RX ADMIN — MIDAZOLAM HYDROCHLORIDE 2 MG: 1 INJECTION, SOLUTION INTRAMUSCULAR; INTRAVENOUS at 12:53

## 2017-07-28 RX ADMIN — FENTANYL CITRATE 50 MCG: 50 INJECTION, SOLUTION INTRAMUSCULAR; INTRAVENOUS at 14:15

## 2017-07-28 RX ADMIN — FENTANYL CITRATE 50 MCG: 50 INJECTION, SOLUTION INTRAMUSCULAR; INTRAVENOUS at 13:55

## 2017-07-28 RX ADMIN — FENTANYL CITRATE 25 MCG: 50 INJECTION, SOLUTION INTRAMUSCULAR; INTRAVENOUS at 13:35

## 2017-07-28 RX ADMIN — SODIUM CHLORIDE, POTASSIUM CHLORIDE, SODIUM LACTATE AND CALCIUM CHLORIDE: 600; 310; 30; 20 INJECTION, SOLUTION INTRAVENOUS at 13:19

## 2017-07-28 RX ADMIN — GLYCOPYRROLATE 0.8 MG: 0.2 INJECTION, SOLUTION INTRAMUSCULAR; INTRAVENOUS at 13:33

## 2017-07-28 RX ADMIN — ROCURONIUM BROMIDE 5 MG: 10 INJECTION INTRAVENOUS at 13:03

## 2017-07-28 RX ADMIN — KETOROLAC TROMETHAMINE 30 MG: 30 INJECTION, SOLUTION INTRAMUSCULAR at 11:19

## 2017-07-28 RX ADMIN — NEOSTIGMINE METHYLSULFATE 4 MG: 1 INJECTION INTRAMUSCULAR; INTRAVENOUS; SUBCUTANEOUS at 13:33

## 2017-07-28 RX ADMIN — FENTANYL CITRATE 50 MCG: 50 INJECTION, SOLUTION INTRAMUSCULAR; INTRAVENOUS at 13:03

## 2017-07-28 RX ADMIN — HYDROCODONE BITARTRATE AND ACETAMINOPHEN 1 TABLET: 5; 325 TABLET ORAL at 15:10

## 2017-07-28 RX ADMIN — Medication 120 MG: at 13:03

## 2017-07-28 RX ADMIN — CEFAZOLIN SODIUM 2 G: 2 INJECTION, SOLUTION INTRAVENOUS at 12:53

## 2017-07-28 RX ADMIN — GLYCOPYRROLATE 0.2 MG: 0.2 INJECTION, SOLUTION INTRAMUSCULAR; INTRAVENOUS at 13:00

## 2017-07-28 RX ADMIN — SODIUM CHLORIDE, POTASSIUM CHLORIDE, SODIUM LACTATE AND CALCIUM CHLORIDE: 600; 310; 30; 20 INJECTION, SOLUTION INTRAVENOUS at 11:17

## 2017-07-28 RX ADMIN — LIDOCAINE HYDROCHLORIDE 40 MG: 20 INJECTION, SOLUTION INFILTRATION; PERINEURAL at 13:03

## 2017-07-28 RX ADMIN — ROCURONIUM BROMIDE 15 MG: 10 INJECTION INTRAVENOUS at 13:17

## 2017-07-28 RX ADMIN — ONDANSETRON 4 MG: 2 INJECTION INTRAMUSCULAR; INTRAVENOUS at 13:13

## 2017-07-28 NOTE — ANESTHESIA POSTPROCEDURE EVALUATION
Patient: Devorah FARIAS Premo    Procedure(s):  LAPAROSCOPIC BILATERAL TUBAL OCCLUSION - Wound Class: II-Clean Contaminated    Diagnosis:DESIRES STERILIZATION  Diagnosis Additional Information: No value filed.    Anesthesia Type:  General, ETT    Note:  Anesthesia Post Evaluation    Patient location during evaluation: PACU  Patient participation: Able to fully participate in evaluation  Level of consciousness: awake and alert  Pain management: adequate  Airway patency: patent  Cardiovascular status: acceptable  Respiratory status: acceptable  Hydration status: acceptable  PONV: none     Anesthetic complications: None          Last vitals:  Vitals:    07/28/17 1450 07/28/17 1500 07/28/17 1515   BP: 109/78 106/69 100/55   Resp: 16 16 16   Temp:   98.1  F (36.7  C)   SpO2: 99% 99% 99%         Electronically Signed By: Guru Rodriges MD  July 28, 2017  4:21 PM

## 2017-07-28 NOTE — OP NOTE
[facility] Operative Note:    PREOPERATIVE DIAGNOSIS: Desires sterilization.   POSTOPERATIVE DIAGNOSIS: Desires sterilization.   PROCEDURE: Laparoscopic bilateral tubal occlusion (Filshie clips).   ANESTHESIA: General.   COMPLICATIONS: None.   ESTIMATED BLOOD LOSS: Minimal.   SPECIMENS: None.   OPERATIVE FINDINGS: normal pelvic anatomy.   DESCRIPTION OF PROCEDURE: The patients was brought to the operating room and uneventfully placed under general anesthesia. She was prepped and draped in the dorsal lithotomy position and her bladder drained. The cervix was visualized with a weighted speculum and grasped anteriorly with a fine tooth tenaculum and a uterine manipulating device placed. We then changed gloves and proceeded to the abdominal portion of the case. A 5 mm infraumbilical skin incision was performed and a Veress needle introduced without difficulty. A water drop test was performed. The abdomen was insufflated with several liters of carbon dioxide. A 5 mm trocar and a laparoscope were introduced. Visualization was excellent. Findings were as described above. Next, an 8 mm suprapubic port was placed under direct visualization. After initial exploration, the uterus was elevated and the Filshie clip applicator introduced. A single Filshie clip was then placed on a proximal avascular portion of each fallopian tube with resulting complete occlusion. At this point, there was excellent hemostasis so we proceeded to closure. The trocars were removed and excess carbon dioxide expressed from the abdomen. The subcutaneous spaces were irrigated and checked for hemostasis. The skin incisions were closed with surgical glue. The uterine manipulating device was removed. There were no complications and the patient was transferred to the recovery room in excellent stable condition.   REE PEREZ MD

## 2017-07-28 NOTE — OR NURSING
Patient and responsible adult given discharge instructions with no questions regarding instructions. Amarilys score 19/20. Pain level 3/10.  Discharged from unit via wheelchair. Patient discharged to home.

## 2017-07-28 NOTE — DISCHARGE INSTRUCTIONS
Post-Anesthesia Patient Instructions    IMMEDIATELY FOLLOWING SURGERY:  Do not drive or operate machinery for the first twenty four hours after surgery.  Do not make any important decisions for twenty four hours after surgery or while taking narcotic pain medications or sedatives.  If you develop intractable nausea and vomiting or a severe headache please notify your doctor immediately.    FOLLOW-UP:  Please make an appointment with your surgeon as instructed. You do not need to follow up with anesthesia unless specifically instructed to do so.    WOUND CARE INSTRUCTIONS (if applicable):  Keep a dry clean dressing on the anesthesia/puncture wound site if there is drainage.  Once the wound has quit draining you may leave it open to air.  Generally you should leave the bandage intact for twenty four hours unless there is drainage.  If the epidural site drains for more than 36-48 hours please call the anesthesia department.    QUESTIONS?:  Please feel free to call your physician or the hospital  if you have any questions, and they will be happy to assist you.   Call MD prior to DC.  No driving today or while on pain meds  Pelvic rest for 2 weeks  Call Dr. Valdivia [phone] as necessary if problems in interim, no post op appt needed.  No heavy lifting, vigorous activity, swim, bath, exercise for 1 week

## 2017-07-28 NOTE — ANESTHESIA CARE TRANSFER NOTE
Patient: Devorah FARIAS Premo    Procedure(s):  LAPAROSCOPIC BILATERAL TUBAL OCCLUSION - Wound Class: II-Clean Contaminated    Diagnosis: DESIRES STERILIZATION  Diagnosis Additional Information: No value filed.    Anesthesia Type:   General, ETT     Note:  Airway :Nasal Cannula  Patient transferred to:PACU        Vitals: (Last set prior to Anesthesia Care Transfer)    CRNA VITALS  7/28/2017 1313 - 7/28/2017 1348      7/28/2017             Resp Rate (set): 8                Electronically Signed By: ROMEO Cobb CRNA  July 28, 2017  1:48 PM

## 2017-07-28 NOTE — IP AVS SNAPSHOT
MRN:[id number]                      After Visit Summary   7/28/2017    Devorah Parker    MRN: [id number]           Thank you!     Thank you for choosing [facility] for your care. Our goal is always to provide you with excellent care. Hearing back from our patients is one way we can continue to improve our services. Please take a few minutes to complete the written survey that you may receive in the mail after you visit with us. Thank you!        Patient Information     Date Of Birth          1980        About your hospital stay     You were admitted on:  July 28, 2017 You last received care in the:  [facility] Preop/Phase II    You were discharged on:  July 28, 2017       Who to Call     For medical emergencies, please call 911.  For non-urgent questions about your medical care, please call your primary care provider or clinic, [phone]  For questions related to your surgery, please call your surgery clinic        Attending Provider     Provider Specialty    Perez Valdivia MD OB/Gyn       Primary Care Provider Office Phone # Fax #    Meagan Keane [phone]      Further instructions from your care team           Post-Anesthesia Patient Instructions    IMMEDIATELY FOLLOWING SURGERY:  Do not drive or operate machinery for the first twenty four hours after surgery.  Do not make any important decisions for twenty four hours after surgery or while taking narcotic pain medications or sedatives.  If you develop intractable nausea and vomiting or a severe headache please notify your doctor immediately.    FOLLOW-UP:  Please make an appointment with your surgeon as instructed. You do not need to follow up with anesthesia unless specifically instructed to do so.    WOUND CARE INSTRUCTIONS (if applicable):  Keep a dry clean dressing on the anesthesia/puncture wound site if there is drainage.  Once the wound has quit draining you may leave it open to air.  Generally you should leave the  "bandage intact for twenty four hours unless there is drainage.  If the epidural site drains for more than 36-48 hours please call the anesthesia department.    QUESTIONS?:  Please feel free to call your physician or the hospital  if you have any questions, and they will be happy to assist you.   Call MD prior to DC.  No driving today or while on pain meds  Pelvic rest for 2 weeks  Call Dr. Valdivia [phone] as necessary if problems in interim, no post op appt needed.  No heavy lifting, vigorous activity, swim, bath, exercise for 1 week    Pending Results     No orders found from 2017 to 2017.            Admission Information     Date & Time Provider Department Dept. Phone    2017 Perez Valdivia MD [facility] Preop/Phase [phone]      Your Vitals Were     Blood Pressure Temperature Respirations Height Weight Last Period     97.9  F (36.6  C) 16 1.619 m (5' 3.75\") 104.7 kg (230 lb 12.8 oz) 2017 (Approximate)    Pulse Oximetry BMI (Body Mass Index)                99% 39.93 kg/m2          MyChart Information     [email or web address] lets you send messages to your doctor, view your test results, renew your prescriptions, schedule appointments and more. To sign up, go to [email or web address] . Click on \"Log in\" on the left side of the screen, which will take you to the Welcome page. Then click on \"Sign up Now\" on the right side of the page.     You will be asked to enter the access code listed below, as well as some personal information. Please follow the directions to create your username and password.     Your access code is: [email or web address]  Expires: 2017  6:34 PM     Your access code will  in 90 days. If you need help or a new code, please call your [facility] or [phone].        Care EveryWhere ID     This is your Care EveryWhere ID. This could be used by other organizations to access your [facility] medical records  [id number]        Equal Access to Services     CHARU BELTRÁN " AH: Hadii cody baevanessahai Sochayali, waaxda luqadaha, qaybta kaalmada adejose antonio, kb tonya sierrabeth gallowaycheyannearely kelley. So [facility] [phone].    ATENCIÓN: Si habla quinten, tiene a mills disposición servicios gratuitos de asistencia lingüística. Kellieame al [phone].    We comply with applicable federal civil rights laws and [place] laws. We do not discriminate on the basis of race, color, national origin, age, disability sex, sexual orientation or gender identity.               Review of your medicines      START taking        Dose / Directions    HYDROcodone-acetaminophen 5-325 MG per tablet   Commonly known as:  NORCO   Used for:  Post-operative state        Dose:  2 tablet   Take 2 tablets by mouth every 6 hours as needed for moderate to severe pain   Quantity:  30 tablet   Refills:  0       ibuprofen 800 MG tablet   Commonly known as:  ADVIL/MOTRIN   Used for:  Post-operative state        Dose:  800 mg   Take 1 tablet (800 mg) by mouth every 8 hours as needed for moderate pain   Quantity:  40 tablet   Refills:  1         CONTINUE these medicines which have NOT CHANGED        Dose / Directions    NICORETTE MT        Dose:  2 mg   Take 2 mg by mouth every hour as needed for smoking cessation   Refills:  0         STOP taking     norgestim-eth estrad triphasic 0.18/0.215/0.25 MG-35 MCG per tablet   Commonly known as:  ORTHO TRI-CYCLEN, TRI-SPRINTEC                Where to get your medicines      These medications were sent to [facility] [place] - [address]  [address] [place] [address]     Phone:  [phone]     ibuprofen 800 MG tablet         Some of these will need a paper prescription and others can be bought over the counter. Ask your nurse if you have questions.     Bring a paper prescription for each of these medications     HYDROcodone-acetaminophen 5-325 MG per tablet                Protect others around you: Learn how to safely use, store and throw away your medicines  at www.disposemymeds.org.             Medication List: This is a list of all your medications and when to take them. Check marks below indicate your daily home schedule. Keep this list as a reference.      Medications           Morning Afternoon Evening Bedtime As Needed    HYDROcodone-acetaminophen 5-325 MG per tablet   Commonly known as:  NORCO   Take 2 tablets by mouth every 6 hours as needed for moderate to severe pain                                ibuprofen 800 MG tablet   Commonly known as:  ADVIL/MOTRIN   Take 1 tablet (800 mg) by mouth every 8 hours as needed for moderate pain                                NICORETTE MT   Take 2 mg by mouth every hour as needed for smoking cessation                                          More Information        Your Laparoscopic Tubal Sterilization Procedure  Getting ready for surgery  Your doctor will talk with you about preparing for surgery. You will need to:    Sign a sterilization consent form. This often must be signed weeks in advance.    Have tests, such as blood tests. These help show your general health.    Tell your doctor if you take any medicines, supplements, or herbal remedies. You may need to stop taking some of them before surgery.    Stop eating and drinking anything after midnight, the night before surgery.    Arrange for an adult family member or friend to give you a ride home after surgery.    Arrive at the hospital or surgical facility on time. You will be asked to sign certain forms and change into a patient gown.  During surgery    You ll be given an IV (intravenous line) and medicine that lets you sleep during surgery.    After the anesthesia takes effect, your surgeon makes a small incision in or below your navel.    Your abdomen is inflated with small amounts of gas to lift the abdominal wall. This makes it easier to guide instruments to the tubes.    Your surgeon then inserts the laparoscope to view the organs in your abdomen.    Surgical  instruments may be placed through the laparoscope or through other small incisions.    The fallopian tubes are blocked using one of several methods (see below).    Once the tubes are blocked, your surgeon slowly releases the gas and removes the instruments.    The incisions are closed with sutures or staples.  Blocking the fallopian tubes  To block the tubes, your surgeon will use one of the methods listed below.       Cauterization uses electrical current to heat and seal each tube. The sealed ends of the tubes may then be cut. A ring or band closes each tube, keeping egg and sperm from being able to meet. It is left in place. A clip shuts off each tube, blocking the passage of sperm and egg. It is left in place.   After surgery  You ll rest in the recovery area until you feel well enough to go home. Be sure to have an adult friend or family member drive you. You will likely feel tired, so take it easy for the rest of the day. Ask your doctor when it s OK to resume your normal routine. For the first few days you may have:    Pain at the incision sites. Use pain relief medicine if needed.    Shoulder pain. This is caused by the gas used during surgery. You may also have a gassy or bloated feeling.    A small amount of vaginal bleeding. Use pads instead of tampons.  When to call your healthcare provider  Call your healthcare provider if you have:    Redness, drainage, or swelling at the incisions    A fever of 100.4 F (38 C) or higher, or as directed by your healthcare provider    Difficulty urinating    Foul-smelling or unusual vaginal discharge    Severe abdominal pain or bloating    Nausea or vomiting    Persistent or heavy bleeding. This means more than a pad an hour for 2 hours.    A missed period, irregular bleeding, or severe abdominal pain. These symptoms can be signs of a tubal pregnancy.     Date Last Reviewed: 5/12/2015 2000-2017 [employer]. [address] [place] [address], [place] [address]. All  rights reserved. This information is not intended as a substitute for professional medical care. Always follow your healthcare professional's instructions.

## 2017-07-28 NOTE — IP AVS SNAPSHOT
[facility]    [address] [address]    Phone:  [phone]                                       After Visit Summary   7/28/2017    Devorah Parker    MRN: [id number]           After Visit Summary Signature Page     I have received my discharge instructions, and my questions have been answered. I have discussed any challenges I see with this plan with the nurse or doctor.    ..........................................................................................................................................  Patient/Patient Representative Signature      ..........................................................................................................................................  Patient Representative Print Name and Relationship to Patient    ..................................................               ................................................  Date                                            Time    ..........................................................................................................................................  Reviewed by Signature/Title    ...................................................              ..............................................  Date                                                            Time

## 2019-10-21 ENCOUNTER — HOSPITAL ENCOUNTER (EMERGENCY)
Facility: HOSPITAL | Age: 39
Discharge: HOME | End: 2019-10-21
Attending: EMERGENCY MEDICINE | Admitting: EMERGENCY MEDICINE
Payer: COMMERCIAL

## 2019-10-21 VITALS
HEART RATE: 104 BPM | DIASTOLIC BLOOD PRESSURE: 62 MMHG | TEMPERATURE: 98.8 F | SYSTOLIC BLOOD PRESSURE: 132 MMHG | RESPIRATION RATE: 20 BRPM | OXYGEN SATURATION: 98 %

## 2019-10-21 DIAGNOSIS — Z86.14 HISTORY OF MRSA INFECTION: ICD-10-CM

## 2019-10-21 DIAGNOSIS — L02.31 LEFT BUTTOCK ABSCESS: ICD-10-CM

## 2019-10-21 DIAGNOSIS — Z87.2 HISTORY OF ABSCESS OF SKIN AND SUBCUTANEOUS TISSUE: ICD-10-CM

## 2019-10-21 LAB — GLUCOSE BLDC GLUCOMTR-MCNC: 107 MG/DL (ref 70–99)

## 2019-10-21 PROCEDURE — 00000146 ZZHCL STATISTIC GLUCOSE BY METER IP

## 2019-10-21 PROCEDURE — 25000132 ZZH RX MED GY IP 250 OP 250 PS 637: Performed by: EMERGENCY MEDICINE

## 2019-10-21 PROCEDURE — 10060 I&D ABSCESS SIMPLE/SINGLE: CPT

## 2019-10-21 PROCEDURE — 99282 EMERGENCY DEPT VISIT SF MDM: CPT | Mod: 25 | Performed by: EMERGENCY MEDICINE

## 2019-10-21 PROCEDURE — 10060 I&D ABSCESS SIMPLE/SINGLE: CPT | Performed by: EMERGENCY MEDICINE

## 2019-10-21 PROCEDURE — 99282 EMERGENCY DEPT VISIT SF MDM: CPT | Mod: 25

## 2019-10-21 RX ORDER — CLINDAMYCIN HCL 150 MG
300 CAPSULE ORAL ONCE
Status: COMPLETED | OUTPATIENT
Start: 2019-10-21 | End: 2019-10-21

## 2019-10-21 RX ORDER — CLINDAMYCIN HCL 300 MG
300 CAPSULE ORAL 4 TIMES DAILY
Qty: 40 CAPSULE | Refills: 0 | Status: SHIPPED | OUTPATIENT
Start: 2019-10-21 | End: 2019-10-31

## 2019-10-21 RX ORDER — DEXTROAMPHETAMINE SACCHARATE, AMPHETAMINE ASPARTATE, DEXTROAMPHETAMINE SULFATE AND AMPHETAMINE SULFATE 7.5; 7.5; 7.5; 7.5 MG/1; MG/1; MG/1; MG/1
30 TABLET ORAL PRN
COMMUNITY
End: 2019-10-22

## 2019-10-21 RX ADMIN — CLINDAMYCIN HYDROCHLORIDE 300 MG: 150 CAPSULE ORAL at 05:21

## 2019-10-21 NOTE — ED AVS SNAPSHOT
[facility]  [address] [address]  Phone:  [phone]                                    Devorah Parker   MRN: [id number]    Department:  [facility]   Date of Visit:  10/21/2019           After Visit Summary Signature Page    I have received my discharge instructions, and my questions have been answered. I have discussed any challenges I see with this plan with the nurse or doctor.    ..........................................................................................................................................  Patient/Patient Representative Signature      ..........................................................................................................................................  Patient Representative Print Name and Relationship to Patient    ..................................................               ................................................  Date                                   Time    ..........................................................................................................................................  Reviewed by Signature/Title    ...................................................              ..............................................  Date                                               Time          22EPIC Rev 08/18

## 2019-10-21 NOTE — ED NOTES
Patient states that 2 days ago she developed what she believes is an ingrown hair on left butt cheek.  She states that now it is open and she has noticed pus and bloody drainage.  States pain increased today so she decided to come in to be seen. She states she was going to try and wait and go to urgent care or primary, but couldn't wait.

## 2019-10-21 NOTE — ED PROVIDER NOTES
History     Chief Complaint   Patient presents with     Wound Infection     HPI  Devorah Parker is a 39 year old female who presents with report of left buttock abscess, history of prior abdominal wall abscess found to have MRSA, denies diabetes, no fevers, no chills.  Patient notes drainage and feels warm.  No headache.  No chest pain.  No shortness of breath.  No abdominal pain.  Normal appetite.  No emesis.  Denies pregnancy.  Denies other significant past medical history.    Allergies:  No Known Allergies    Problem List:    Patient Active Problem List    Diagnosis Date Noted     Attention deficit disorder 2012     Priority: Medium     Depression 06/15/2011     Priority: Medium     Adiposity 2008     Priority: Medium     History of cervical dysplasia 2008     Priority: Medium     Overview:   IMO Update 10/11       Positive test for human papillomavirus (HPV) 2007     Priority: Medium     Nonspecific abnormal finding 2007     Priority: Medium     Overview:   HPV +, colp low grade, path chronic cervicitis          Past Medical History:    Past Medical History:   Diagnosis Date     ADD (attention deficit disorder)      Anxiety      Cervical dysplasia      Depression      GERD (gastroesophageal reflux disease)      Obesity        Past Surgical History:    Past Surgical History:   Procedure Laterality Date     ARTHROSCOPY KNEE Left     ACL reconstruction     LAPAROSCOPIC TUBAL OCCLUSION Bilateral 2017    Procedure: LAPAROSCOPIC TUBAL OCCLUSION PROCEDURE;  LAPAROSCOPIC BILATERAL TUBAL OCCLUSION;  Surgeon: Perez Valdivia MD;  Location: [facility]     LEEP TX, CERVICAL       TONSILLECTOMY      at 4 yrs       Family History:    Family History   Problem Relation Age of Onset     Kidney Disease Mother         stones     Breast Cancer Mother      Myocardial Infarction Maternal Grandmother          at age 50s     Lung Cancer Paternal Grandfather        Social History:  Marital Status:   Single [1]  Social History     Tobacco Use     Smoking status: Current Every Day Smoker     Packs/day: 0.25     Types: Cigarettes     Smokeless tobacco: Never Used     Tobacco comment: using gum   Substance Use Topics     Alcohol use: No     Drug use: No        Medications:    amphetamine-dextroamphetamine (ADDERALL) 30 MG tablet  clindamycin (CLEOCIN) 300 MG capsule  ibuprofen (ADVIL/MOTRIN) 800 MG tablet      Review of Systems   Respiratory denies.  Cardiovascular denies.  Constitutional denies.  GI denies.  Skin per HPI    Physical Exam   BP: 128/68  Pulse: 116  Temp: 98.8  F (37.1  C)  Resp: 18  SpO2: 97 %      Physical Exam  Constitutional:       Appearance: Normal appearance.   HENT:      Head: Normocephalic and atraumatic.      Nose: Nose normal.      Mouth/Throat:      Mouth: Mucous membranes are dry.   Eyes:      Extraocular Movements: Extraocular movements intact.      Pupils: Pupils are equal, round, and reactive to light.   Neck:      Musculoskeletal: Normal range of motion.   Cardiovascular:      Rate and Rhythm: Normal rate.   Pulmonary:      Effort: Pulmonary effort is normal.   Abdominal:      Palpations: Abdomen is soft.      Tenderness: There is no tenderness.   Musculoskeletal: Normal range of motion.   Skin:     Comments: Left buttock abscess that is not perirectal but rather of the buttock, small drainage noted- pus.  Induration and focal area of fluctuance noted.  Cellulitis noted.  Quite tender.   Neurological:      Mental Status: She is alert and oriented to person, place, and time.   Psychiatric:         Mood and Affect: Mood normal.     Procedure Note: Incision and drainage of complex abscess with packing placement.  Wound anesthetized with lidocaine with epinephrine.    Subsequently 11 blade utilized for incision.  Loculations broken up with curved mosquito.  Much pus exuded in all vectors until no further pus expressed.  Rather large cavity.  Packing placed.  Patient notes she is feeling  much better.  She notes the pressure is gone.           Results for orders placed or performed during the hospital encounter of 10/21/19 (from the past 24 hour(s))   Glucose by meter   Result Value Ref Range    Glucose 107 (H) 70 - 99 mg/dL       Medications   clindamycin (CLEOCIN) capsule 300 mg (300 mg Oral Given 10/21/19 0521)       Assessments & Plan (with Medical Decision Making)   Patient with history of MRSA and abdominal wall abscess presenting now with left buttock abscess.  Is not perirectal.  It is quite compellingly about the soft tissue of the buttock proper.  Incision and drainage undertaken with packing placed.  Large cavity with much pus exuded.  Patient feeling better.  Advised of potential to worsen and advised her to return if any new or concerning symptoms immediately and otherwise to follow-up in 2 days time in surgery clinic for recheck and assessment for packing removal.  Clindamycin initiated orally here in the ED and prescription provided.  Plan for outpatient life culture containing yogurt or similar to offset potential for antibiotic associated diarrhea.  Patient agreed with the above plan.  Advised to return if any new or concerning symptoms. Glucose fasting returns at 107.  No known history of diabetes.  Patient advised to follow-up for recheck.      Discharge Medication List as of 10/21/2019  5:24 AM      START taking these medications    Details   clindamycin (CLEOCIN) 300 MG capsule Take 1 capsule (300 mg) by mouth 4 times daily for 10 days, Disp-40 capsule, R-0, Local Print             Final diagnoses:   Left buttock abscess   History of MRSA infection   History of abscess of skin and subcutaneous tissue       10/21/2019   [facility]     Connor Esquivel MD  10/21/19 6782

## 2019-10-21 NOTE — ED NOTES
Discharge instructions gone over with patient and she states understanding.  Patient is then discharged in stable condition, ambulatory, per self.

## 2019-10-22 ENCOUNTER — OFFICE VISIT (OUTPATIENT)
Dept: SURGERY | Facility: OTHER | Age: 39
End: 2019-10-22
Attending: EMERGENCY MEDICINE
Payer: COMMERCIAL

## 2019-10-22 VITALS
WEIGHT: 221 LBS | DIASTOLIC BLOOD PRESSURE: 78 MMHG | HEART RATE: 104 BPM | BODY MASS INDEX: 43.39 KG/M2 | RESPIRATION RATE: 18 BRPM | OXYGEN SATURATION: 97 % | HEIGHT: 60 IN | SYSTOLIC BLOOD PRESSURE: 120 MMHG | TEMPERATURE: 97.8 F

## 2019-10-22 DIAGNOSIS — Z71.6 ENCOUNTER FOR TOBACCO USE CESSATION COUNSELING: ICD-10-CM

## 2019-10-22 DIAGNOSIS — F17.200 TOBACCO DEPENDENCE: ICD-10-CM

## 2019-10-22 DIAGNOSIS — E66.01 MORBID OBESITY (H): Primary | ICD-10-CM

## 2019-10-22 PROCEDURE — G0463 HOSPITAL OUTPT CLINIC VISIT: HCPCS

## 2019-10-22 PROCEDURE — 99203 OFFICE O/P NEW LOW 30 MIN: CPT | Performed by: SURGERY

## 2019-10-22 RX ORDER — DEXTROAMPHETAMINE SACCHARATE, AMPHETAMINE ASPARTATE MONOHYDRATE, DEXTROAMPHETAMINE SULFATE AND AMPHETAMINE SULFATE 7.5; 7.5; 7.5; 7.5 MG/1; MG/1; MG/1; MG/1
30 CAPSULE, EXTENDED RELEASE ORAL
COMMUNITY
Start: 2019-10-10

## 2019-10-22 ASSESSMENT — PAIN SCALES - GENERAL: PAINLEVEL: NO PAIN (0)

## 2019-10-22 ASSESSMENT — MIFFLIN-ST. JEOR: SCORE: 1609.35

## 2019-10-22 NOTE — PROGRESS NOTES
"Surgery Consult Clinic Note      RE: Devorah Parker  : 1980    Chief Complaint:  Left buttock abscess    History of Present Illness:  Mrs. Parker is a very pleasant 39 year old female who I am seeing at the request of Dr. Connor Esquivel MD for evaluation of left buttock abscess that was I&D'ed in the ER yesterday.  She noticed a \"painful bump\" on her buttock 4 days ago and thought she had an ingrown hair or \"pimple\".  Thought nothing of it.  They went to a water park for the day.  The next day she noticed that the painful lump had gotten much bitter and now she was having subjective fevers.  After a day of progressively worsening pain, malaise, she presented to the ER early morning on Monday.  They lanced it with return of purulent fluid per their report, packed with with 1/4 inch guaze strips and started her on clindamycin.  She's had a history of MRSA infection in the past after getting her panus stuck in her zipper.  Today she states she's feeling much better.  Its draining a lot of fluid.     Medical history:  Past Medical History:   Diagnosis Date     ADD (attention deficit disorder)      Anxiety      Cervical dysplasia      Depression      GERD (gastroesophageal reflux disease)      Obesity        Surgical history:  Past Surgical History:   Procedure Laterality Date     ARTHROSCOPY KNEE Left     ACL reconstruction     LAPAROSCOPIC TUBAL OCCLUSION Bilateral 2017    Procedure: LAPAROSCOPIC TUBAL OCCLUSION PROCEDURE;  LAPAROSCOPIC BILATERAL TUBAL OCCLUSION;  Surgeon: Perez Valdivia MD;  Location: [facility] [place], CERVICAL       TONSILLECTOMY      at 4 yrs       Family history:  Family History   Problem Relation Age of Onset     Kidney Disease Mother         stones     Breast Cancer Mother      Myocardial Infarction Maternal Grandmother          at age 50s     Lung Cancer Paternal Grandfather        Medications:  Prior to Admission medications    Medication Sig Start Date End Date " "Taking? Authorizing Provider   amphetamine-dextroamphetamine (ADDERALL XR) 30 MG 24 hr capsule Take 30 mg by mouth 10/10/19  Yes Reported, Patient   clindamycin (CLEOCIN) 300 MG capsule Take 1 capsule (300 mg) by mouth 4 times daily for 10 days 10/21/19 10/31/19 Yes Connor Esquivel MD   ibuprofen (ADVIL/MOTRIN) 800 MG tablet Take 1 tablet (800 mg) by mouth every 8 hours as needed for moderate pain 7/28/17  Yes Perez Valdivia MD       Allergies:  The patienthas No Known Allergies.  .  Social history:  Social History     Tobacco Use     Smoking status: Current Every Day Smoker     Packs/day: 0.25     Types: Cigarettes     Smokeless tobacco: Never Used     Tobacco comment: using gum   Substance Use Topics     Alcohol use: No     Marital status: single.    Review of Systems:    Constitutional: Negative for fever, chills and weight loss.   HENT: Negative for ear pain, nosebleeds, congestion, sore throat, tinnitus and ear discharge.    Eyes: Negative for blurred vision, double vision, photophobia and pain.   Respiratory: Negative for cough, hemoptysis, shortness of breath, wheezing and stridor.    Cardiovascular: Negative for chest pain, palpitations and orthopnea.   Gastrointestinal: Negative for heartburn, nausea, vomiting, abdominal pain and blood in stool.   Genitourinary: Negative for urgency, frequency and hematuria.   Musculoskeletal: Negative for myalgias, back pain and joint pain.   Neurological: Negative for tingling, speech change and headaches.   Endo/Heme/Allergies: Does not bruise/bleed easily.   Psychiatric/Behavioral: Negative for depression, suicidal ideas and hallucinations. The patient is not nervous/anxious.    Physical Examination:  /78 (BP Location: Right arm, Patient Position: Sitting, Cuff Size: Adult Large)   Pulse 104   Temp 97.8  F (36.6  C) (Tympanic)   Resp 18   Ht 1.533 m (5' 0.34\")   Wt 100.2 kg (221 lb)   SpO2 97%   BMI 42.68 kg/m    General: AAOx4, NAD, WN/WD, " ambulating without assistance  HEENT:NCAT, EOMI, PERRL Sclerae anicteric; Trachea mideline, no JVD  Chest:   Clear to auscultation bilaterally.  Cardiac: S1S2 , regular rate and rhythm without additional sounds  Abdomen:Obese, soft, ND/NT no rebound, no guarding  Extremities: Cursory exam unremarkable.  Skin: Warm, dry, < 2 sec cap refill left buttock small 3 mm wound with Nuguaze, slight surrounding erythema, resolving induration  Neuro: CN 2-12 grossly intact, no focal deficit, GCS 15  Psych: calm, asks appropriate questions      Assessment/Plan:  #1 Left buttock abscess  #2 Morbid obesity  #3 Tobacco dependence    Thank you for the consult.  There appears to be adequate source control.  The packing was removed and replaced here.  She was given verbal and written instructions to do BID dressing changes with nu guaze to 4x4.  Baths and showers were encouraged.  She states she has a lot of close friends who are nurses that can assist with the dressing changes.  Will have her return to clinic next week for wound check.  She was advised that I will be out on medical leave.  She was also instructed to return sooner if things are getting worse.      We spent a considerable amount of time discussing her weight and her tobacco status as risk factors for getting this and its affect on the healing time.            Dr. Calixto DO, [facility]  [address], [address]  [place]    [address]    Referring Provider:  Connor Esquivel MD  [address] [place] [address]     Primary Care Provider:  Meagan Keane

## 2019-10-22 NOTE — NURSING NOTE
"Chief Complaint   Patient presents with     Consult     Left Buttock abscess Hx of MRSA        Initial /78 (BP Location: Right arm, Patient Position: Sitting, Cuff Size: Adult Large)   Pulse 104   Temp 97.8  F (36.6  C) (Tympanic)   Resp 18   Ht 1.533 m (5' 0.34\")   Wt 100.2 kg (221 lb)   SpO2 97%   BMI 42.68 kg/m   Estimated body mass index is 42.68 kg/m  as calculated from the following:    Height as of this encounter: 1.533 m (5' 0.34\").    Weight as of this encounter: 100.2 kg (221 lb).  Medication Reconciliation: complete  Kelsi Coburn LPN    "

## 2019-10-22 NOTE — PATIENT INSTRUCTIONS
"Thank you for allowing Dr. De Luna and the surgical team to participate in your care today. Please call with any scheduling questions to our Unit Health Coordinator at [phone] or any other questions to the nurse at  [phone].     POST PROCEDURE INSTRUCTIONS    Apply ice to the surgical area to reduce swelling if desired. (no longer than 20 minutes at a time)  Remove your dressing and change twice daily with Twist in and Twist out with packing wound lightly with leaving a small amount of the packing out. Wash incision gently with soap and water twice daily or let soapy shower water run over the wound. Do not scrub the wound.  Keep incision clean and dry.   Do not submerge wound in water such as a tub bath or swimming.   Do not do dishes or \"dirty work\" if wound is on hands.   Do not put make-up, deodorant, powders, hairspray, lotions, etc on the incision.  Cover with a clean dressing daily or when wet/soiled    You can use acetaminophen(Tylenol) and Ibuprofen for pain.     If you have any bleeding, cover the wound with clean gauze and hold pressure for 10-15 Minutes. If the bleeding does not stop or is heavy and profuse, call the clinic or go to the Urgent Care/Emergency Department.    SIGNS OF INFECTION:    Watch for redness, swelling, red streaks, pus, drainage, warmth, fever, increased pain, foul smell.   Contact our office or your primary health care provider if you notice any of the warning signs.     FOLLOW - UP  If you are having issues. Follow up in 1 week Nurse only .     Call the office with any questions.     "

## 2019-10-23 ENCOUNTER — ALLIED HEALTH/NURSE VISIT (OUTPATIENT)
Dept: FAMILY MEDICINE | Facility: OTHER | Age: 39
End: 2019-10-23
Attending: SURGERY
Payer: COMMERCIAL

## 2019-10-23 DIAGNOSIS — Z48.00 CHANGE OF DRESSING: Primary | ICD-10-CM

## 2019-10-24 ENCOUNTER — ALLIED HEALTH/NURSE VISIT (OUTPATIENT)
Dept: SURGERY | Facility: OTHER | Age: 39
End: 2019-10-24
Attending: SURGERY
Payer: COMMERCIAL

## 2019-10-24 DIAGNOSIS — Z48.01 ENCOUNTER FOR CHANGE OR REMOVAL OF SURGICAL WOUND DRESSING: Primary | ICD-10-CM

## 2019-10-24 NOTE — NURSING NOTE
Patient tolerated procedure well came in to educate  on dressing change no odor noted and will continue dressing changes x 2 until next follow up.

## 2019-10-31 ENCOUNTER — ALLIED HEALTH/NURSE VISIT (OUTPATIENT)
Dept: SURGERY | Facility: OTHER | Age: 39
End: 2019-10-31
Attending: SURGERY
Payer: COMMERCIAL

## 2019-10-31 VITALS — TEMPERATURE: 98 F

## 2019-10-31 DIAGNOSIS — Z48.00 ENCOUNTER FOR CHANGE OF DRESSING: Primary | ICD-10-CM

## 2019-10-31 PROCEDURE — G0463 HOSPITAL OUTPT CLINIC VISIT: HCPCS

## 2019-10-31 ASSESSMENT — PAIN SCALES - GENERAL: PAINLEVEL: NO PAIN (0)

## 2019-10-31 NOTE — PROGRESS NOTES
Patient here for wound check left buttock abscess. She reports that the wound is so small that she is unable to get any packing in and has been covering it with loose dry gauze without tape. She also reports no drainage and that her pain is completely done. Temp 98.     The wound appears dry. No drainage. The opening of wound is approximately the width and depth of a pencil tip.     Advised patient that she can just continue to observe area, keeping it clean and dry. She will call for appointment if any further concerns about the wound.

## 2021-02-22 ENCOUNTER — TRANSFERRED RECORDS (OUTPATIENT)
Dept: HEALTH INFORMATION MANAGEMENT | Facility: CLINIC | Age: 41
End: 2021-02-22

## 2021-02-23 PROBLEM — F41.9 ANXIETY: Status: ACTIVE | Noted: 2019-02-21

## 2021-03-01 DIAGNOSIS — H91.93 DECREASED HEARING OF BOTH EARS: Primary | ICD-10-CM

## 2021-03-08 ENCOUNTER — OFFICE VISIT (OUTPATIENT)
Dept: OTOLARYNGOLOGY | Facility: OTHER | Age: 41
End: 2021-03-08
Attending: AUDIOLOGIST
Payer: COMMERCIAL

## 2021-03-08 ENCOUNTER — OFFICE VISIT (OUTPATIENT)
Dept: AUDIOLOGY | Facility: OTHER | Age: 41
End: 2021-03-08
Attending: AUDIOLOGIST
Payer: COMMERCIAL

## 2021-03-08 VITALS
WEIGHT: 219 LBS | HEART RATE: 84 BPM | DIASTOLIC BLOOD PRESSURE: 66 MMHG | OXYGEN SATURATION: 96 % | BODY MASS INDEX: 36.49 KG/M2 | HEIGHT: 65 IN | TEMPERATURE: 97.5 F | SYSTOLIC BLOOD PRESSURE: 110 MMHG

## 2021-03-08 DIAGNOSIS — Z01.10 EXAMINATION OF EARS AND HEARING: Primary | ICD-10-CM

## 2021-03-08 DIAGNOSIS — S09.21XA TRAUMATIC RUPTURE OF RIGHT EAR DRUM, INITIAL ENCOUNTER: Primary | ICD-10-CM

## 2021-03-08 DIAGNOSIS — F17.200 TOBACCO USE DISORDER: ICD-10-CM

## 2021-03-08 DIAGNOSIS — H91.93 DECREASED HEARING OF BOTH EARS: ICD-10-CM

## 2021-03-08 PROCEDURE — 99213 OFFICE O/P EST LOW 20 MIN: CPT | Mod: 25 | Performed by: NURSE PRACTITIONER

## 2021-03-08 PROCEDURE — 92557 COMPREHENSIVE HEARING TEST: CPT | Performed by: AUDIOLOGIST

## 2021-03-08 PROCEDURE — 92567 TYMPANOMETRY: CPT | Performed by: AUDIOLOGIST

## 2021-03-08 PROCEDURE — 92504 EAR MICROSCOPY EXAMINATION: CPT | Performed by: NURSE PRACTITIONER

## 2021-03-08 PROCEDURE — G0463 HOSPITAL OUTPT CLINIC VISIT: HCPCS | Mod: 25

## 2021-03-08 ASSESSMENT — MIFFLIN-ST. JEOR: SCORE: 1665.29

## 2021-03-08 ASSESSMENT — PAIN SCALES - GENERAL: PAINLEVEL: NO PAIN (0)

## 2021-03-08 NOTE — PROGRESS NOTES
Otolaryngology Note         Chief Complaint:     Patient presents with:  Ent Problem: Patient referred by Adilson for Q-tip possibly damaged ear in the right ear.  Pt was referred by Liz Oviedo.           History of Present Illness:     Devorah Parker is a 40 year old female seen today for concerns for possible q-tip injury to the right ear.  The injury happened 2/21/21.  She had a small amount of serous drainage immediately after with significant pain.  She was seen the next day, she was prescribed otics.  She had decreased hearing for a short time.  Today she reports that hearing is back to baseline at this time.  No further pain.  No recent otorrhea.     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  No other otological surgery  No family hx of hearing loss   No current concerns with otalgia, otorrhea.    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      Audiogram completed today:  Tympanograms are Type A for both ears suggesting normal eardrum mobility.  Thresholds are normal range both ears.  Speech reception thresholds are in good agreement with pure tone average.  Word discrimination scores are excellent at supra-thresholds level.         Medications:     Current Outpatient Rx   Medication Sig Dispense Refill     amphetamine-dextroamphetamine (ADDERALL XR) 30 MG 24 hr capsule Take 30 mg by mouth              Allergies:     Allergies: Patient has no known allergies.          Past Medical History:     Past Medical History:   Diagnosis Date     ADD (attention deficit disorder)      Anxiety      Cervical dysplasia      Depression      GERD (gastroesophageal reflux disease)      Obesity             Past Surgical History:     Past Surgical History:   Procedure Laterality Date     ARTHROSCOPY KNEE Left     ACL reconstruction     LAPAROSCOPIC TUBAL  "OCCLUSION Bilateral 7/28/2017    Procedure: LAPAROSCOPIC TUBAL OCCLUSION PROCEDURE;  LAPAROSCOPIC BILATERAL TUBAL OCCLUSION;  Surgeon: Perez Valdivia MD;  Location: [facility]     LEEP TX, CERVICAL       TONSILLECTOMY      at 4 yrs       ENT family history reviewed         Social History:     Social History     Tobacco Use     Smoking status: Current Every Day Smoker     Packs/day: 0.25     Types: Cigarettes     Smokeless tobacco: Never Used     Tobacco comment: using gum   Substance Use Topics     Alcohol use: No     Drug use: No            Review of Systems:     ROS: See HPI         Physical Exam:     /66 (BP Location: Left arm, Cuff Size: Adult Large)   Pulse 84   Temp 97.5  F (36.4  C) (Tympanic)   Ht 1.645 m (5' 4.75\")   Wt 99.3 kg (219 lb)   SpO2 96%   BMI 36.73 kg/m      General - The patient is well nourished and well developed, and appears to have good nutritional status.  Alert and oriented to person and place, answers questions and cooperates with examination appropriately.   Head and Face - Normocephalic and atraumatic, with no gross asymmetry noted.  The facial nerve is intact, with strong symmetric movements.  Voice and Breathing - The patient was breathing comfortably without the use of accessory muscles. There was no wheezing, stridor. The patients voice was clear and strong, and had appropriate pitch and quality.  Ears - The ears were examined under binocular microscopy and with otoscope.  External ear normal. Canals are patent. Right tympanic membrane is intact, there is a scabbed over area on the inferior posterior TM,  No effusion or retraction noted.   NO otorrhea.  Left tympanic membrane is intact without effusion, retraction or mass.  Eyes - Extraocular movements intact, sclera were not icteric or injected, conjunctiva were pink and moist.  Mouth - Examination of the oral cavity showed pink, healthy oral mucosa. Dentition in good condition. No lesions or ulcerations noted. The tongue " was mobile and midline.   Throat - The walls of the oropharynx were smooth, pink, moist, symmetric, and had no lesions or ulcerations.  The tonsillar pillars and soft palate were symmetric. The uvula was midline on elevation.    Neck - Normal midline excursion of the laryngotracheal complex during swallowing.  Full range of motion on passive movement.  Palpation of the occipital, submental, submandibular, internal jugular chain, and supraclavicular nodes did not demonstrate any abnormal lymph nodes or masses.  Palpation of the thyroid was soft and smooth, with no nodules or goiter appreciated.  The trachea was mobile and midline.  Nose - External contour is symmetric, no gross deflection or scars.  Nasal mucosa is pink and moist with no abnormal mucus.  The septum and turbinates were evaluated with nasal speculum, no polyps, masses, or purulence noted on examination of the anterior nasal cavity.         Assessment and Plan:       ICD-10-CM    1. Traumatic rupture of right ear drum, initial encounter  S09.21XA     TM is healing nicely   2. Tobacco use disorder  F17.200        Complete 10 more days of drops to the right ear.    Follow up as needed with decreased hearing, drainage, ear pain or any other concerns  No more q-tips in the ears!  Consider quitting smoking    Meagan LAND  [facility]  2:27 PM  March 8, 2021

## 2021-03-08 NOTE — PROGRESS NOTES
Audiology Evaluation Completed. Please refer SCANNED AUDIOGRAM and/or TYMPANOGRAM for BACKGROUND, RESULTS, RECOMMENDATIONS.      Colleen AVILEZ, [facility]  Audiologist #[id number]

## 2021-03-08 NOTE — PATIENT INSTRUCTIONS
Thank you for allowing Meagan Franks NP and our ENT team to participate in your care.  If your medications are too expensive, please give the nurse a call.  We can possibly change this medication.  If you have a scheduling or an appointment question please contact our Health Unit Coordinator at their direct line [phone].   ALL nursing questions or concerns can be directed to your ENT nurse at: [phone] - Agnieszka    Complete 10 more days of drops to the right ear.    Follow up as needed with decreased hearing, drainage, ear pain or any other concerns  No more q-tips in the ears!  Consider quitting smoking

## 2021-03-08 NOTE — NURSING NOTE
"Chief Complaint   Patient presents with     Ent Problem     Patient referred by Adilson for Q-tip possibly damaged ear in the right ear.  Pt was referred by Liz Oviedo.       Initial /66 (BP Location: Left arm, Cuff Size: Adult Large)   Pulse 84   Temp 97.5  F (36.4  C) (Tympanic)   Ht 1.645 m (5' 4.75\")   Wt 99.3 kg (219 lb)   SpO2 96%   BMI 36.73 kg/m   Estimated body mass index is 36.73 kg/m  as calculated from the following:    Height as of this encounter: 1.645 m (5' 4.75\").    Weight as of this encounter: 99.3 kg (219 lb).  Medication Reconciliation: complete  [place] LPN    "

## 2021-03-08 NOTE — LETTER
3/8/2021         RE: Devorah Parker  [address]  [place] [address]        Dear Colleague,    Thank you for referring your patient, Devorah Parker, to the [facility]. Please see a copy of my visit note below.    Otolaryngology Note         Chief Complaint:     Patient presents with:  Ent Problem: Patient referred by Adilson for Q-tip possibly damaged ear in the right ear.  Pt was referred by Liz Oviedo.           History of Present Illness:     Devorah Parker is a 40 year old female seen today for concerns for possible q-tip injury to the right ear.  The injury happened 2/21/21.  She had a small amount of serous drainage immediately after with significant pain.  She was seen the next day, she was prescribed otics.  She had decreased hearing for a short time.  Today she reports that hearing is back to baseline at this time.  No further pain.  No recent otorrhea.     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  No other otological surgery  No family hx of hearing loss   No current concerns with otalgia, otorrhea.    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      Audiogram completed today:  Tympanograms are Type A for both ears suggesting normal eardrum mobility.  Thresholds are normal range both ears.  Speech reception thresholds are in good agreement with pure tone average.  Word discrimination scores are excellent at supra-thresholds level.         Medications:     Current Outpatient Rx   Medication Sig Dispense Refill     amphetamine-dextroamphetamine (ADDERALL XR) 30 MG 24 hr capsule Take 30 mg by mouth              Allergies:     Allergies: Patient has no known allergies.          Past Medical History:     Past Medical History:   Diagnosis Date     ADD (attention deficit disorder)      Anxiety      Cervical  "dysplasia      Depression      GERD (gastroesophageal reflux disease)      Obesity             Past Surgical History:     Past Surgical History:   Procedure Laterality Date     ARTHROSCOPY KNEE Left     ACL reconstruction     LAPAROSCOPIC TUBAL OCCLUSION Bilateral 7/28/2017    Procedure: LAPAROSCOPIC TUBAL OCCLUSION PROCEDURE;  LAPAROSCOPIC BILATERAL TUBAL OCCLUSION;  Surgeon: Perez Valdivia MD;  Location: [facility] OR     [facility] [place], CERVICAL       TONSILLECTOMY      at 4 yrs       ENT family history reviewed         Social History:     Social History     Tobacco Use     Smoking status: Current Every Day Smoker     Packs/day: 0.25     Types: Cigarettes     Smokeless tobacco: Never Used     Tobacco comment: using gum   Substance Use Topics     Alcohol use: No     Drug use: No            Review of Systems:     ROS: See HPI         Physical Exam:     /66 (BP Location: Left arm, Cuff Size: Adult Large)   Pulse 84   Temp 97.5  F (36.4  C) (Tympanic)   Ht 1.645 m (5' 4.75\")   Wt 99.3 kg (219 lb)   SpO2 96%   BMI 36.73 kg/m      General - The patient is well nourished and well developed, and appears to have good nutritional status.  Alert and oriented to person and place, answers questions and cooperates with examination appropriately.   Head and Face - Normocephalic and atraumatic, with no gross asymmetry noted.  The facial nerve is intact, with strong symmetric movements.  Voice and Breathing - The patient was breathing comfortably without the use of accessory muscles. There was no wheezing, stridor. The patients voice was clear and strong, and had appropriate pitch and quality.  Ears - The ears were examined under binocular microscopy and with otoscope.  External ear normal. Canals are patent. Right tympanic membrane is intact, there is a scabbed over area on the inferior posterior TM,  No effusion or retraction noted.   NO otorrhea.  Left tympanic membrane is intact without effusion, retraction or mass.  Eyes - " Extraocular movements intact, sclera were not icteric or injected, conjunctiva were pink and moist.  Mouth - Examination of the oral cavity showed pink, healthy oral mucosa. Dentition in good condition. No lesions or ulcerations noted. The tongue was mobile and midline.   Throat - The walls of the oropharynx were smooth, pink, moist, symmetric, and had no lesions or ulcerations.  The tonsillar pillars and soft palate were symmetric. The uvula was midline on elevation.    Neck - Normal midline excursion of the laryngotracheal complex during swallowing.  Full range of motion on passive movement.  Palpation of the occipital, submental, submandibular, internal jugular chain, and supraclavicular nodes did not demonstrate any abnormal lymph nodes or masses.  Palpation of the thyroid was soft and smooth, with no nodules or goiter appreciated.  The trachea was mobile and midline.  Nose - External contour is symmetric, no gross deflection or scars.  Nasal mucosa is pink and moist with no abnormal mucus.  The septum and turbinates were evaluated with nasal speculum, no polyps, masses, or purulence noted on examination of the anterior nasal cavity.         Assessment and Plan:       ICD-10-CM    1. Traumatic rupture of right ear drum, initial encounter  S09.21XA     TM is healing nicely   2. Tobacco use disorder  F17.200        Complete 10 more days of drops to the right ear.    Follow up as needed with decreased hearing, drainage, ear pain or any other concerns  No more q-tips in the ears!  Consider quitting smoking    Meagan LAND  [facility]  2:27 PM  March 8, 2021          Again, thank you for allowing me to participate in the care of your patient.        Sincerely,        Meagan Franks NP

## (undated) DEVICE — TRAY-SKIN PREP POVIDONE/IODINE

## (undated) DEVICE — TOPICAL SKIN ADHESIVE EXOFIN

## (undated) DEVICE — APPLICATOR-CHLORAPREP 26ML TINTED CHG 2%+ 70% IPA-SURGICAL

## (undated) DEVICE — SUTURE-MONOCRYL 4-0 PS-2 Y496G

## (undated) DEVICE — PACK-LAP LAVH-CUSTOM

## (undated) DEVICE — TOWEL-OR DISP 4PKS

## (undated) DEVICE — TROCAR-5X100MM BLADED W/FIXATION

## (undated) DEVICE — GLV-8.5 BIOGEL LATEX

## (undated) DEVICE — NDL-INSUFFLATION 120MM

## (undated) DEVICE — IRRIGATION-NACL 1000ML

## (undated) DEVICE — CAUTERY PAD-POLYHESIVE II ADULT

## (undated) DEVICE — LIGHT HANDLE COVER

## (undated) DEVICE — CATH-URETHRAL 14FR

## (undated) DEVICE — IRRIGATION-H2O 1000ML

## (undated) DEVICE — BLADE-SCALPEL #11

## (undated) DEVICE — UTERINE MANIPULATOR-KRONNER MANIPUJECTOR

## (undated) DEVICE — TROCAR-8X100MM OPTICAL BLADELESS

## (undated) DEVICE — LABEL-STERILE PREPRINTED FOR OR

## (undated) DEVICE — CANISTER-SUCTION 2000CC

## (undated) DEVICE — TUBING-INSUFFLATION/LAPAROFLATOR W/FILTER

## (undated) RX ORDER — GLYCOPYRROLATE 0.2 MG/ML
INJECTION, SOLUTION INTRAMUSCULAR; INTRAVENOUS
Status: DISPENSED
Start: 2017-07-28

## (undated) RX ORDER — FENTANYL CITRATE 50 UG/ML
INJECTION, SOLUTION INTRAMUSCULAR; INTRAVENOUS
Status: DISPENSED
Start: 2017-07-28

## (undated) RX ORDER — PROPOFOL 10 MG/ML
INJECTION, EMULSION INTRAVENOUS
Status: DISPENSED
Start: 2017-07-28

## (undated) RX ORDER — LIDOCAINE HYDROCHLORIDE 20 MG/ML
INJECTION, SOLUTION EPIDURAL; INFILTRATION; INTRACAUDAL; PERINEURAL
Status: DISPENSED
Start: 2017-07-28

## (undated) RX ORDER — DEXAMETHASONE SODIUM PHOSPHATE 10 MG/ML
INJECTION, SOLUTION INTRAMUSCULAR; INTRAVENOUS
Status: DISPENSED
Start: 2017-07-28

## (undated) RX ORDER — ONDANSETRON 2 MG/ML
INJECTION INTRAMUSCULAR; INTRAVENOUS
Status: DISPENSED
Start: 2017-07-28

## (undated) RX ORDER — NEOSTIGMINE METHYLSULFATE 5 MG/5 ML
SYRINGE (ML) INTRAVENOUS
Status: DISPENSED
Start: 2017-07-28